# Patient Record
Sex: FEMALE | Race: WHITE | NOT HISPANIC OR LATINO
[De-identification: names, ages, dates, MRNs, and addresses within clinical notes are randomized per-mention and may not be internally consistent; named-entity substitution may affect disease eponyms.]

---

## 2019-02-11 PROBLEM — Z00.00 ENCOUNTER FOR PREVENTIVE HEALTH EXAMINATION: Status: ACTIVE | Noted: 2019-02-11

## 2019-02-19 ENCOUNTER — APPOINTMENT (OUTPATIENT)
Dept: NEUROSURGERY | Facility: CLINIC | Age: 74
End: 2019-02-19
Payer: MEDICARE

## 2019-02-19 DIAGNOSIS — M47.816 SPONDYLOSIS W/OUT MYELOPATHY OR RADICULOPATHY, LUMBAR REGION: ICD-10-CM

## 2019-02-19 PROCEDURE — 99204 OFFICE O/P NEW MOD 45 MIN: CPT

## 2019-02-20 PROBLEM — M47.816 LUMBAR SPONDYLOSIS: Status: ACTIVE | Noted: 2019-02-20

## 2019-02-20 RX ORDER — METOPROLOL SUCCINATE 100 MG/1
100 TABLET, EXTENDED RELEASE ORAL
Refills: 0 | Status: ACTIVE | COMMUNITY

## 2019-02-20 NOTE — PLAN
[FreeTextEntry1] : At this time Ms. Wen has had significant resolution of her radicular symptoms. Her hip pain is controlled with cortisone injections at this time. She will cont being active. SHould her hip symptoms return in a more proximal fashion she will be refered to orthopedic surgery. No intervention at this time for her lumbar spondylosis as she is asymptomatic. . Will cont to follow her as needed.

## 2019-02-20 NOTE — HISTORY OF PRESENT ILLNESS
[FreeTextEntry1] : Improved leg pain [de-identified] : This is a divina 73 year old female who was a previous patient at [facility]. SHe has had a previous L4-5 laminoforaminotomy with resumption of radiculopathy followed by revision fro stenosis and scarring in that area by me several years ago. She continued to have radiculopathy post op however now with time and PT she has had significant improvemen in her Left lower extremity pain. She has been having left hip pain which she has had 2 cortisone injections she has had 1 year relief with the first and just had the second. SHe is here to discuss the findings of her new lumbar MRI obtained by her pain management doctor which shows left L4-5 post op changes and continued significant foraminal stenosis. Her MRI hip on the left shows severe advanced OA and labral tear.

## 2022-10-26 ENCOUNTER — APPOINTMENT (OUTPATIENT)
Dept: ORTHOPEDIC SURGERY | Facility: CLINIC | Age: 77
End: 2022-10-26

## 2022-10-26 ENCOUNTER — OUTPATIENT (OUTPATIENT)
Dept: OUTPATIENT SERVICES | Facility: HOSPITAL | Age: 77
LOS: 1 days | End: 2022-10-26
Payer: MEDICARE

## 2022-10-26 ENCOUNTER — RESULT REVIEW (OUTPATIENT)
Age: 77
End: 2022-10-26

## 2022-10-26 VITALS
HEART RATE: 74 BPM | OXYGEN SATURATION: 97 % | HEIGHT: 59 IN | WEIGHT: 130 LBS | TEMPERATURE: 98.4 F | DIASTOLIC BLOOD PRESSURE: 80 MMHG | BODY MASS INDEX: 26.21 KG/M2 | SYSTOLIC BLOOD PRESSURE: 160 MMHG

## 2022-10-26 DIAGNOSIS — Z96.651 PRESENCE OF RIGHT ARTIFICIAL KNEE JOINT: ICD-10-CM

## 2022-10-26 DIAGNOSIS — Z78.9 OTHER SPECIFIED HEALTH STATUS: ICD-10-CM

## 2022-10-26 DIAGNOSIS — Z86.39 PERSONAL HISTORY OF OTHER ENDOCRINE, NUTRITIONAL AND METABOLIC DISEASE: ICD-10-CM

## 2022-10-26 DIAGNOSIS — Z87.39 PERSONAL HISTORY OF OTHER DISEASES OF THE MUSCULOSKELETAL SYSTEM AND CONNECTIVE TISSUE: ICD-10-CM

## 2022-10-26 DIAGNOSIS — Z86.79 PERSONAL HISTORY OF OTHER DISEASES OF THE CIRCULATORY SYSTEM: ICD-10-CM

## 2022-10-26 PROCEDURE — 73562 X-RAY EXAM OF KNEE 3: CPT | Mod: 26,RT

## 2022-10-26 PROCEDURE — 73562 X-RAY EXAM OF KNEE 3: CPT

## 2022-10-26 PROCEDURE — 99024 POSTOP FOLLOW-UP VISIT: CPT

## 2022-10-26 RX ORDER — ESCITALOPRAM OXALATE 10 MG/1
10 TABLET, FILM COATED ORAL
Refills: 0 | Status: ACTIVE | COMMUNITY

## 2022-10-26 RX ORDER — ASPIRIN 81 MG
81 TABLET, DELAYED RELEASE (ENTERIC COATED) ORAL
Refills: 0 | Status: ACTIVE | COMMUNITY

## 2022-10-26 RX ORDER — AMLODIPINE BESYLATE 2.5 MG/1
2.5 TABLET ORAL
Refills: 0 | Status: ACTIVE | COMMUNITY

## 2022-10-26 RX ORDER — ESCITALOPRAM OXALATE 5 MG/1
5 TABLET, FILM COATED ORAL
Refills: 0 | Status: ACTIVE | COMMUNITY

## 2022-10-26 RX ORDER — ACETAMINOPHEN 500 MG/1
500 TABLET ORAL
Qty: 30 | Refills: 0 | Status: ACTIVE | COMMUNITY
Start: 2022-10-26 | End: 1900-01-01

## 2022-10-26 RX ORDER — ATORVASTATIN CALCIUM 40 MG/1
40 TABLET, FILM COATED ORAL
Refills: 0 | Status: ACTIVE | COMMUNITY

## 2022-10-26 NOTE — HISTORY OF PRESENT ILLNESS
[de-identified] : Ms. Wen is a pleasant patient who is just over 2 months status post right total knee arthroplasty.  She is doing very well.  She continues with physical therapy.  Not using any assistive devices.  No fevers chills night sweats nausea vomiting chest pain or shortness of breath.  She is taking Aleve for pain relief at times.  She feels the knee may lack some full extension but otherwise is very happy with her result. [Stable] : stable

## 2022-10-26 NOTE — ASSESSMENT
[FreeTextEntry1] : Patient is 2+ month status post right total knee arthroplasty.  She had a stable postoperative course.  She is doing very well.  We talked about activity advancement strategies and precautions she is going to continue physical therapy.  She is not quite ready to return to work given the amount of walking involves that she works for the [employer].  We discussed symptomatic treatments that she can use if needed including use of acetaminophen.  At this point she is very pleased with her results.  We are going to follow-up in approximately 6 months.  She will hesitate to contact with any questions or concerns in the interim.

## 2022-10-26 NOTE — REASON FOR VISIT
[Initial Visit] : an initial visit for [Total Knee Replacement Surgery, Aftercare] : total knee replacement surgery, aftercare

## 2022-10-26 NOTE — PHYSICAL EXAM
[Normal] : Gait: normal [LE] : Sensory: Intact in bilateral lower extremities [de-identified] : Examination the right knee reveals a well-healed incision.  Range of motion from -2 to 130 degrees.  There is no instability to varus valgus and posterior stability testing.  Swelling is within normal limits.  There is some clicking with range of motion.\par \par Negative Homans [de-identified] : 3 views of the right knee are reviewed.  There is a total knee arthroplasty in place with no hardware complication loosening fracture or dislocation

## 2023-09-06 ENCOUNTER — OUTPATIENT (OUTPATIENT)
Dept: OUTPATIENT SERVICES | Facility: HOSPITAL | Age: 78
LOS: 1 days | End: 2023-09-06

## 2023-09-06 ENCOUNTER — APPOINTMENT (OUTPATIENT)
Dept: RADIOLOGY | Facility: CLINIC | Age: 78
End: 2023-09-06
Payer: MEDICARE

## 2023-09-06 ENCOUNTER — APPOINTMENT (OUTPATIENT)
Dept: ORTHOPEDIC SURGERY | Facility: CLINIC | Age: 78
End: 2023-09-06
Payer: MEDICARE

## 2023-09-06 ENCOUNTER — RESULT REVIEW (OUTPATIENT)
Age: 78
End: 2023-09-06

## 2023-09-06 VITALS
HEART RATE: 80 BPM | SYSTOLIC BLOOD PRESSURE: 145 MMHG | WEIGHT: 130 LBS | OXYGEN SATURATION: 97 % | DIASTOLIC BLOOD PRESSURE: 76 MMHG | HEIGHT: 59 IN | BODY MASS INDEX: 26.21 KG/M2

## 2023-09-06 DIAGNOSIS — M16.12 UNILATERAL PRIMARY OSTEOARTHRITIS, LEFT HIP: ICD-10-CM

## 2023-09-06 PROCEDURE — 73501 X-RAY EXAM HIP UNI 1 VIEW: CPT | Mod: 26,LT

## 2023-09-06 PROCEDURE — 99214 OFFICE O/P EST MOD 30 MIN: CPT

## 2023-09-06 PROCEDURE — 73503 X-RAY EXAM HIP UNI 4/> VIEWS: CPT

## 2023-09-06 NOTE — HISTORY OF PRESENT ILLNESS
[de-identified] : Patient presents today for evaluation of her left hip.  She had a left hip replacement she says approximately 4 years ago.  She has had a right knee replacement about a year ago and has no difficulties with that.  She was doing well and then was squatting at home and had a dislocation of her left hip.  She had to call for help and was taken to emergency room where closed reduction was performed.  Prior to that she had noticed 2 episodes where she felt it sublux but it had not completely dislocated.  Of note she has had significant spine disease which has been worsening.  She has been followed by neurology and was told that surgery reviewed no further benefit for her although the lower lumbar levels were significantly affected and she has developed a foot drop on the left side.  She is currently being fitted for an AFO brace but does not have that at this time.  She denies fevers chills or any known significant infections.

## 2023-09-06 NOTE — REVIEW OF SYSTEMS
[Arthralgia] : no arthralgia [Joint Pain] : no joint pain [Joint Stiffness] : no joint stiffness [Chills] : no chills

## 2023-09-06 NOTE — DISCUSSION/SUMMARY
[de-identified] : Status post left total hip arthroplasty with recent dislocation after several episodes of what sounds like subluxation.  I suspect that her spine pathology and associated nerve pathology may be a contributing factor.  She was not able to tolerate the knee immobilizer but will continue using a cane.  Instructed to use a pillow between her legs at night.  I am going to send her for screening ESR and CRP although this does not seem to be consistent with infectious etiology.  We are going to try to treat this nonoperatively with physical therapy as well with strict posterior hip precautions rather than immediate revision surgery low we did discuss that activities are necessary.  We will follow-up in 6 weeks to gauge her response.  In the interim I am also going to obtain sitting and standing films of the lumbar spine.

## 2023-09-06 NOTE — PHYSICAL EXAM
[de-identified] : Physical exam she is alert and oriented.  She is walking with mild antalgia.  Her left hip incision is well-healed.  I did not take her range of motion given the recent incident.  She does have weakness of ankle dorsiflexion 3-4 out of 5 as well as inability to dorsiflex the great toe on the left side.  Foot eversion and inversion are 4+ out of 5. [de-identified] : 4 views of the hip are taken.  There is an uncemented hip prosthesis with no obvious hardware complication loosening fracture or dislocation.

## 2023-09-11 LAB
CRP SERPL-MCNC: <3 MG/L
ERYTHROCYTE [SEDIMENTATION RATE] IN BLOOD BY WESTERGREN METHOD: 11 MM/HR
HCT VFR BLD CALC: 42.1 %
HGB BLD-MCNC: 13.5 G/DL
MCHC RBC-ENTMCNC: 31.3 PG
MCHC RBC-ENTMCNC: 32.1 GM/DL
MCV RBC AUTO: 97.7 FL
PLATELET # BLD AUTO: 183 K/UL
RBC # BLD: 4.31 M/UL
RBC # FLD: 12.6 %
WBC # FLD AUTO: 7.27 K/UL

## 2023-10-25 ENCOUNTER — OUTPATIENT (OUTPATIENT)
Dept: OUTPATIENT SERVICES | Facility: HOSPITAL | Age: 78
LOS: 1 days | End: 2023-10-25

## 2023-10-25 ENCOUNTER — APPOINTMENT (OUTPATIENT)
Dept: ORTHOPEDIC SURGERY | Facility: CLINIC | Age: 78
End: 2023-10-25
Payer: MEDICARE

## 2023-10-25 ENCOUNTER — APPOINTMENT (OUTPATIENT)
Dept: RADIOLOGY | Facility: CLINIC | Age: 78
End: 2023-10-25
Payer: MEDICARE

## 2023-10-25 VITALS
HEIGHT: 59 IN | WEIGHT: 130 LBS | DIASTOLIC BLOOD PRESSURE: 90 MMHG | SYSTOLIC BLOOD PRESSURE: 182 MMHG | OXYGEN SATURATION: 96 % | BODY MASS INDEX: 26.21 KG/M2 | HEART RATE: 84 BPM

## 2023-10-25 PROCEDURE — 73502 X-RAY EXAM HIP UNI 2-3 VIEWS: CPT | Mod: 26,LT

## 2023-10-25 PROCEDURE — 99214 OFFICE O/P EST MOD 30 MIN: CPT

## 2023-12-11 ENCOUNTER — OUTPATIENT (OUTPATIENT)
Dept: OUTPATIENT SERVICES | Facility: HOSPITAL | Age: 78
LOS: 1 days | End: 2023-12-11
Payer: MEDICARE

## 2023-12-11 ENCOUNTER — LABORATORY RESULT (OUTPATIENT)
Age: 78
End: 2023-12-11

## 2023-12-11 ENCOUNTER — RESULT REVIEW (OUTPATIENT)
Age: 78
End: 2023-12-11

## 2023-12-11 ENCOUNTER — APPOINTMENT (OUTPATIENT)
Dept: ORTHOPEDIC SURGERY | Facility: CLINIC | Age: 78
End: 2023-12-11
Payer: MEDICARE

## 2023-12-11 VITALS
OXYGEN SATURATION: 96 % | SYSTOLIC BLOOD PRESSURE: 145 MMHG | DIASTOLIC BLOOD PRESSURE: 77 MMHG | WEIGHT: 137 LBS | BODY MASS INDEX: 27.62 KG/M2 | HEART RATE: 72 BPM | HEIGHT: 59 IN

## 2023-12-11 DIAGNOSIS — Z01.818 ENCOUNTER FOR OTHER PREPROCEDURAL EXAMINATION: ICD-10-CM

## 2023-12-11 DIAGNOSIS — Z22.322 CARRIER OR SUSPECTED CARRIER OF METHICILLIN RESISTANT STAPHYLOCOCCUS AUREUS: ICD-10-CM

## 2023-12-11 DIAGNOSIS — M85.80 OTHER SPECIFIED DISORDERS OF BONE DENSITY AND STRUCTURE, UNSPECIFIED SITE: ICD-10-CM

## 2023-12-11 DIAGNOSIS — E83.10 DISORDER OF IRON METABOLISM, UNSPECIFIED: ICD-10-CM

## 2023-12-11 DIAGNOSIS — R79.1 ABNORMAL COAGULATION PROFILE: ICD-10-CM

## 2023-12-11 DIAGNOSIS — R82.90 UNSPECIFIED ABNORMAL FINDINGS IN URINE: ICD-10-CM

## 2023-12-11 LAB
MRSA SPEC QL CULT: NEGATIVE
STAPH AUREUS (SA): NEGATIVE

## 2023-12-11 PROCEDURE — 73502 X-RAY EXAM HIP UNI 2-3 VIEWS: CPT | Mod: 26,LT

## 2023-12-11 PROCEDURE — 73502 X-RAY EXAM HIP UNI 2-3 VIEWS: CPT

## 2023-12-11 PROCEDURE — 99214 OFFICE O/P EST MOD 30 MIN: CPT

## 2023-12-11 PROCEDURE — 71046 X-RAY EXAM CHEST 2 VIEWS: CPT

## 2023-12-11 PROCEDURE — 71046 X-RAY EXAM CHEST 2 VIEWS: CPT | Mod: 26

## 2023-12-12 LAB
25(OH)D3 SERPL-MCNC: 27.8 NG/ML
ALBUMIN SERPL ELPH-MCNC: 4.4 G/DL
ALP BLD-CCNC: 90 U/L
ALT SERPL-CCNC: 36 U/L
ANION GAP SERPL CALC-SCNC: 10 MMOL/L
APPEARANCE: CLEAR
APTT BLD: 31.3 SEC
AST SERPL-CCNC: 38 U/L
BASOPHILS # BLD AUTO: 0.03 K/UL
BASOPHILS NFR BLD AUTO: 0.5 %
BILIRUB SERPL-MCNC: 0.3 MG/DL
BILIRUBIN URINE: NEGATIVE
BLOOD URINE: NEGATIVE
BUN SERPL-MCNC: 22 MG/DL
CALCIUM SERPL-MCNC: 9.6 MG/DL
CHLORIDE SERPL-SCNC: 102 MMOL/L
CO2 SERPL-SCNC: 28 MMOL/L
COLOR: YELLOW
CREAT SERPL-MCNC: 0.65 MG/DL
EGFR: 91 ML/MIN/1.73M2
EOSINOPHIL # BLD AUTO: 0.24 K/UL
EOSINOPHIL NFR BLD AUTO: 3.9 %
GLUCOSE QUALITATIVE U: NEGATIVE MG/DL
GLUCOSE SERPL-MCNC: 109 MG/DL
HCT VFR BLD CALC: 39.6 %
HGB BLD-MCNC: 12.8 G/DL
IMM GRANULOCYTES NFR BLD AUTO: 0.3 %
INR PPP: 1.05 RATIO
KETONES URINE: NEGATIVE MG/DL
LEUKOCYTE ESTERASE URINE: ABNORMAL
LYMPHOCYTES # BLD AUTO: 1.87 K/UL
LYMPHOCYTES NFR BLD AUTO: 30.6 %
MAN DIFF?: NORMAL
MCHC RBC-ENTMCNC: 30.7 PG
MCHC RBC-ENTMCNC: 32.3 GM/DL
MCV RBC AUTO: 95 FL
MONOCYTES # BLD AUTO: 0.5 K/UL
MONOCYTES NFR BLD AUTO: 8.2 %
NEUTROPHILS # BLD AUTO: 3.45 K/UL
NEUTROPHILS NFR BLD AUTO: 56.5 %
NITRITE URINE: NEGATIVE
PH URINE: 5.5
PLATELET # BLD AUTO: 210 K/UL
POTASSIUM SERPL-SCNC: 3.8 MMOL/L
PREALB SERPL NEPH-MCNC: 26 MG/DL
PROT SERPL-MCNC: 6.6 G/DL
PROTEIN URINE: NEGATIVE MG/DL
PT BLD: 11.9 SEC
RBC # BLD: 4.17 M/UL
RBC # FLD: 12.4 %
SODIUM SERPL-SCNC: 140 MMOL/L
SPECIFIC GRAVITY URINE: 1.02
TRANSFERRIN SERPL-MCNC: 287 MG/DL
UROBILINOGEN URINE: 0.2 MG/DL
WBC # FLD AUTO: 6.11 K/UL

## 2023-12-21 ENCOUNTER — TRANSCRIPTION ENCOUNTER (OUTPATIENT)
Age: 78
End: 2023-12-21

## 2023-12-21 VITALS
RESPIRATION RATE: 16 BRPM | TEMPERATURE: 98 F | WEIGHT: 138.23 LBS | SYSTOLIC BLOOD PRESSURE: 155 MMHG | DIASTOLIC BLOOD PRESSURE: 83 MMHG | OXYGEN SATURATION: 97 % | HEIGHT: 59 IN | HEART RATE: 69 BPM

## 2023-12-21 RX ORDER — POVIDONE-IODINE 5 %
1 AEROSOL (ML) TOPICAL ONCE
Refills: 0 | Status: COMPLETED | OUTPATIENT
Start: 2023-12-22 | End: 2023-12-22

## 2023-12-21 NOTE — ASU PATIENT PROFILE, ADULT - NSICDXPASTSURGICALHX_GEN_ALL_CORE_FT
PAST SURGICAL HISTORY:  Previous back surgery x2    S/P knee replacement right     PAST SURGICAL HISTORY:  Previous back surgery x2    S/P knee replacement right    S/P total left hip arthroplasty 2019

## 2023-12-21 NOTE — ASU PATIENT PROFILE, ADULT - NS PRO AD PATIENT TYPE
Dr Rooney, primary care, [phone]/Health Care Proxy (HCP) Dr Rooney, primary care, [phone]/Health Care Proxy (HCP)

## 2023-12-21 NOTE — H&P ADULT - NSHPPHYSICALEXAM_GEN_ALL_CORE
PE: Decreased ROM to left hip secondary to pain      Rest of PE per medical clearance PE: Left LE with Knee immobilizer on. SLT INTACT, DP/PT 2+, EHL 0/5  FHL/TA/GS 5/5  Decreased ROM to left hip secondary to pain      Rest of PE per medical clearance

## 2023-12-21 NOTE — H&P ADULT - NSHPLABSRESULTS_GEN_ALL_CORE
Preop EKG: NSR, reviewed by medical clearance.   CXR: Within normal limits, per medical clearance.   Preop H/H 12.1/35.8  Cr 0.7 preop   3M DOS

## 2023-12-21 NOTE — ASU PATIENT PROFILE, ADULT - NSICDXPASTMEDICALHX_GEN_ALL_CORE_FT
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

## 2023-12-21 NOTE — ASU PATIENT PROFILE, ADULT - FALL HARM RISK - HARM RISK INTERVENTIONS
Communicate Risk of Fall with Harm to all staff/Reinforce activity limits and safety measures with patient and family/Tailored Fall Risk Interventions/Visual Cue: Yellow wristband and red socks/Bed in lowest position, wheels locked, appropriate side rails in place/Call bell, personal items and telephone in reach/Instruct patient to call for assistance before getting out of bed or chair/Non-slip footwear when patient is out of bed/[place] to call system/Physically safe environment - no spills, clutter or unnecessary equipment/Purposeful Proactive Rounding/Room/bathroom lighting operational, light cord in reach Communicate Risk of Fall with Harm to all staff/Reinforce activity limits and safety measures with patient and family/Tailored Fall Risk Interventions/Visual Cue: Yellow wristband and red socks/Bed in lowest position, wheels locked, appropriate side rails in place/Call bell, personal items and telephone in reach/Instruct patient to call for assistance before getting out of bed or chair/Non-slip footwear when patient is out of bed/[place] to call system/Physically safe environment - no spills, clutter or unnecessary equipment/Purposeful Proactive Rounding/Room/bathroom lighting operational, light cord in reach

## 2023-12-21 NOTE — H&P ADULT - PROBLEM SELECTOR PLAN 1
Admit to Orthopaedic Service.  Presents today for revision left hip replacement   Pt medically stable and cleared for procedure today by Dr. Georges

## 2023-12-21 NOTE — H&P ADULT - NSICDXPASTMEDICALHX_GEN_ALL_CORE_FT
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

## 2023-12-21 NOTE — H&P ADULT - HISTORY OF PRESENT ILLNESS
78F with left hip pain x       Presents today for revision of left hip replacement.  79yo f coming in today for Revision LEFT THR. Pt. reports she had initial LEFT THR done with Dr. Archer in 2019 vs 2020? Pt. was doing well but within the last 2 weeks she dislocated three times. Pt. reports she was trying to put on socks or tie her shoes. She lives in [place] and was reduced at [facility]. Pt. went to see Dr. Archer, had xrays done and was told she needs revision (possibly a bigger head). Pt. has baseline neuropathy due to spine issues and history of drop foot. Pt. has been wearing knee immobilizer since dislocations.       Presents today for revision of left hip replacement.  79yo f coming in today for Revision LEFT THR. Pt. reports she had initial LEFT THR done with Dr. Archer in 2019 vs 2020? Pt. was doing well but within the last 2 weeks she dislocated three times. Pt. reports she was trying to put on socks or tie her shoes. She lives in [place] and was reduced at [facility]. Pt. went to see Dr. Archer, had xrays done and was told she needs revision (possibly a bigger head). Pt. has baseline neuropathy due to spine issues and history of drop foot. Pt. has been wearing knee immobilizer since dislocations.       Presents today for revision of left hip replacement.

## 2023-12-21 NOTE — ASU PREOP CHECKLIST - AICD PRESENT
Patient called stating that she believes she has a bladder infection. Patient can be reached at [phone].    no

## 2023-12-21 NOTE — ASU PREOP CHECKLIST - ORDERS/MEDICATION ADMINISTRATION RECORD ON CHART
Plan: Discussed diagnosis in detail with patient today. 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 Patient states that her father had this condition also. Informed patient that treatment is to maintain the symptoms as there is no cure for this condition. Recommended for patient to moisturize her hands daily. Informed patient that some patient's with this condition with shave their hands to reduce the appearance and the texture of these lesions. Patient voiced understanding and states that these lesions do not bother her
Initiate Treatment: - CeraVe SA - Apply as a moisturizer daily \\nOr\\n- Amlactin Cream - Use as a moisturizer daily
Detail Level: Generalized
done

## 2023-12-21 NOTE — H&P ADULT - PROBLEM SELECTOR PLAN 3
"----- Message from Darwin Gutierrez 
Continue outpatient treatment regimen

## 2023-12-22 ENCOUNTER — RESULT REVIEW (OUTPATIENT)
Age: 78
End: 2023-12-22

## 2023-12-22 ENCOUNTER — INPATIENT (INPATIENT)
Facility: HOSPITAL | Age: 78
LOS: 0 days | Discharge: HOME | DRG: 467 | End: 2023-12-23
Attending: SPECIALIST | Admitting: SPECIALIST
Payer: MEDICARE

## 2023-12-22 ENCOUNTER — APPOINTMENT (OUTPATIENT)
Dept: ORTHOPEDIC SURGERY | Facility: HOSPITAL | Age: 78
End: 2023-12-22

## 2023-12-22 DIAGNOSIS — E78.5 HYPERLIPIDEMIA, UNSPECIFIED: ICD-10-CM

## 2023-12-22 DIAGNOSIS — Z98.890 OTHER SPECIFIED POSTPROCEDURAL STATES: Chronic | ICD-10-CM

## 2023-12-22 DIAGNOSIS — I10 ESSENTIAL (PRIMARY) HYPERTENSION: ICD-10-CM

## 2023-12-22 DIAGNOSIS — Z96.642 PRESENCE OF LEFT ARTIFICIAL HIP JOINT: Chronic | ICD-10-CM

## 2023-12-22 DIAGNOSIS — Z96.659 PRESENCE OF UNSPECIFIED ARTIFICIAL KNEE JOINT: Chronic | ICD-10-CM

## 2023-12-22 DIAGNOSIS — M25.559 PAIN IN UNSPECIFIED HIP: ICD-10-CM

## 2023-12-22 DIAGNOSIS — M48.00 SPINAL STENOSIS, SITE UNSPECIFIED: ICD-10-CM

## 2023-12-22 LAB
GRAM STN FLD: SIGNIFICANT CHANGE UP
SPECIMEN SOURCE: SIGNIFICANT CHANGE UP

## 2023-12-22 PROCEDURE — 72170 X-RAY EXAM OF PELVIS: CPT | Mod: 26

## 2023-12-22 PROCEDURE — 27134 REVISE HIP JOINT REPLACEMENT: CPT | Mod: 52,LT

## 2023-12-22 DEVICE — LINER ACET OR3O DM XLPE 40/52MM: Type: IMPLANTABLE DEVICE | Site: LEFT | Status: FUNCTIONAL

## 2023-12-22 DEVICE — FEM HD TAPER OXINIUM 28MM: Type: IMPLANTABLE DEVICE | Site: LEFT | Status: FUNCTIONAL

## 2023-12-22 DEVICE — INSERT ACET OR3O DUAL MOBILITY 28/40MM: Type: IMPLANTABLE DEVICE | Site: LEFT | Status: FUNCTIONAL

## 2023-12-22 RX ORDER — ONDANSETRON 8 MG/1
4 TABLET, FILM COATED ORAL EVERY 6 HOURS
Refills: 0 | Status: DISCONTINUED | OUTPATIENT
Start: 2023-12-22 | End: 2023-12-23

## 2023-12-22 RX ORDER — CEFAZOLIN SODIUM 1 G
2000 VIAL (EA) INJECTION EVERY 8 HOURS
Refills: 0 | Status: COMPLETED | OUTPATIENT
Start: 2023-12-22 | End: 2023-12-23

## 2023-12-22 RX ORDER — ASPIRIN/CALCIUM CARB/MAGNESIUM 324 MG
81 TABLET ORAL
Refills: 0 | Status: DISCONTINUED | OUTPATIENT
Start: 2023-12-23 | End: 2023-12-23

## 2023-12-22 RX ORDER — ACETAMINOPHEN 500 MG
1000 TABLET ORAL EVERY 8 HOURS
Refills: 0 | Status: DISCONTINUED | OUTPATIENT
Start: 2023-12-22 | End: 2023-12-23

## 2023-12-22 RX ORDER — ESCITALOPRAM OXALATE 10 MG/1
10 TABLET, FILM COATED ORAL DAILY
Refills: 0 | Status: DISCONTINUED | OUTPATIENT
Start: 2023-12-22 | End: 2023-12-23

## 2023-12-22 RX ORDER — OXYCODONE HYDROCHLORIDE 5 MG/1
2.5 TABLET ORAL EVERY 4 HOURS
Refills: 0 | Status: DISCONTINUED | OUTPATIENT
Start: 2023-12-22 | End: 2023-12-23

## 2023-12-22 RX ORDER — ACETAMINOPHEN 500 MG
1000 TABLET ORAL ONCE
Refills: 0 | Status: COMPLETED | OUTPATIENT
Start: 2023-12-22 | End: 2023-12-22

## 2023-12-22 RX ORDER — SODIUM CHLORIDE 9 MG/ML
1000 INJECTION, SOLUTION INTRAVENOUS
Refills: 0 | Status: DISCONTINUED | OUTPATIENT
Start: 2023-12-22 | End: 2023-12-23

## 2023-12-22 RX ORDER — CELECOXIB 200 MG/1
200 CAPSULE ORAL ONCE
Refills: 0 | Status: COMPLETED | OUTPATIENT
Start: 2023-12-22 | End: 2023-12-22

## 2023-12-22 RX ORDER — CELECOXIB 200 MG/1
100 CAPSULE ORAL EVERY 12 HOURS
Refills: 0 | Status: DISCONTINUED | OUTPATIENT
Start: 2023-12-22 | End: 2023-12-23

## 2023-12-22 RX ORDER — POLYETHYLENE GLYCOL 3350 17 G/17G
17 POWDER, FOR SOLUTION ORAL AT BEDTIME
Refills: 0 | Status: DISCONTINUED | OUTPATIENT
Start: 2023-12-22 | End: 2023-12-23

## 2023-12-22 RX ORDER — FAMOTIDINE 10 MG/ML
20 INJECTION INTRAVENOUS DAILY
Refills: 0 | Status: DISCONTINUED | OUTPATIENT
Start: 2023-12-22 | End: 2023-12-23

## 2023-12-22 RX ORDER — SENNA PLUS 8.6 MG/1
2 TABLET ORAL AT BEDTIME
Refills: 0 | Status: DISCONTINUED | OUTPATIENT
Start: 2023-12-22 | End: 2023-12-23

## 2023-12-22 RX ORDER — OXYCODONE HYDROCHLORIDE 5 MG/1
5 TABLET ORAL EVERY 6 HOURS
Refills: 0 | Status: DISCONTINUED | OUTPATIENT
Start: 2023-12-22 | End: 2023-12-23

## 2023-12-22 RX ORDER — AMLODIPINE BESYLATE 2.5 MG/1
2.5 TABLET ORAL DAILY
Refills: 0 | Status: DISCONTINUED | OUTPATIENT
Start: 2023-12-22 | End: 2023-12-23

## 2023-12-22 RX ORDER — CHLORHEXIDINE GLUCONATE 213 G/1000ML
1 SOLUTION TOPICAL ONCE
Refills: 0 | Status: COMPLETED | OUTPATIENT
Start: 2023-12-22 | End: 2023-12-22

## 2023-12-22 RX ORDER — METOPROLOL TARTRATE 50 MG
100 TABLET ORAL DAILY
Refills: 0 | Status: DISCONTINUED | OUTPATIENT
Start: 2023-12-22 | End: 2023-12-23

## 2023-12-22 RX ORDER — MAGNESIUM HYDROXIDE 400 MG/1
30 TABLET, CHEWABLE ORAL DAILY
Refills: 0 | Status: DISCONTINUED | OUTPATIENT
Start: 2023-12-22 | End: 2023-12-23

## 2023-12-22 RX ORDER — ATORVASTATIN CALCIUM 80 MG/1
40 TABLET, FILM COATED ORAL AT BEDTIME
Refills: 0 | Status: DISCONTINUED | OUTPATIENT
Start: 2023-12-22 | End: 2023-12-23

## 2023-12-22 RX ADMIN — ATORVASTATIN CALCIUM 40 MILLIGRAM(S): 80 TABLET, FILM COATED ORAL at 22:19

## 2023-12-22 RX ADMIN — CELECOXIB 200 MILLIGRAM(S): 200 CAPSULE ORAL at 11:02

## 2023-12-22 RX ADMIN — CHLORHEXIDINE GLUCONATE 1 APPLICATION(S): 213 SOLUTION TOPICAL at 11:02

## 2023-12-22 RX ADMIN — Medication 1 APPLICATION(S): at 11:17

## 2023-12-22 RX ADMIN — CELECOXIB 100 MILLIGRAM(S): 200 CAPSULE ORAL at 17:57

## 2023-12-22 RX ADMIN — SENNA PLUS 2 TABLET(S): 8.6 TABLET ORAL at 22:19

## 2023-12-22 RX ADMIN — Medication 100 MILLIGRAM(S): at 22:18

## 2023-12-22 RX ADMIN — Medication 1000 MILLIGRAM(S): at 11:02

## 2023-12-22 RX ADMIN — POLYETHYLENE GLYCOL 3350 17 GRAM(S): 17 POWDER, FOR SOLUTION ORAL at 22:19

## 2023-12-22 RX ADMIN — Medication 1000 MILLIGRAM(S): at 23:18

## 2023-12-22 RX ADMIN — OXYCODONE HYDROCHLORIDE 5 MILLIGRAM(S): 5 TABLET ORAL at 17:09

## 2023-12-22 RX ADMIN — OXYCODONE HYDROCHLORIDE 5 MILLIGRAM(S): 5 TABLET ORAL at 17:48

## 2023-12-22 RX ADMIN — Medication 1000 MILLIGRAM(S): at 22:18

## 2023-12-22 NOTE — PHYSICAL THERAPY INITIAL EVALUATION ADULT - PERTINENT HX OF CURRENT PROBLEM, REHAB EVAL
79yo f coming in today for Revision LEFT THR. Pt. reports she had initial LEFT THR done with Dr. Archer in 2019 vs 2020? Pt. was doing well but within the last 2 weeks she dislocated three times. Pt. reports she was trying to put on socks or tie her shoes. She lives in [place] and was reduced at [facility]. Pt. went to see Dr. Archer, had xrays done and was told she needs revision (possibly a bigger head). Pt. has baseline neuropathy due to spine issues and history of drop foot. Pt. has been wearing knee immobilizer since dislocations. Now s/p  revision left hip replacement- head and liner exchange due to instability 77yo f coming in today for Revision LEFT THR. Pt. reports she had initial LEFT THR done with Dr. Archer in 2019 vs 2020? Pt. was doing well but within the last 2 weeks she dislocated three times. Pt. reports she was trying to put on socks or tie her shoes. She lives in [place] and was reduced at [facility]. Pt. went to see Dr. Archer, had xrays done and was told she needs revision (possibly a bigger head). Pt. has baseline neuropathy due to spine issues and history of drop foot. Pt. has been wearing knee immobilizer since dislocations. Now s/p  revision left hip replacement- head and liner exchange due to instability

## 2023-12-22 NOTE — PHYSICAL THERAPY INITIAL EVALUATION ADULT - GENERAL OBSERVATIONS, REHAB EVAL
Patient received semi-perry in bed  in NAD on RA, +SCDs, +PIV. Cleared by ELIZABETH Cedeño. Agreeable to PT.

## 2023-12-22 NOTE — ANESTHESIA FOLLOW-UP NOTE - NSEVALATION_GEN_ALL_CORE
No apparent complications or complaints regarding anesthesia care at this time/All questions were answered Use Map Statement For Sites (Optional): No

## 2023-12-22 NOTE — PHYSICAL THERAPY INITIAL EVALUATION ADULT - MANUAL MUSCLE TESTING RESULTS, REHAB EVAL
B UE and B LE >3+/5 upon functional assessment against gravity except for left DF 3-/5/grossly assessed due to

## 2023-12-22 NOTE — PHYSICAL THERAPY INITIAL EVALUATION ADULT - DIAGNOSIS, PT EVAL
4H: impaired joint mobility, motor function, muscle performance, and range of motion associated with joint arthroplasty 4I: impaired joint mobility, motor function, muscle performance, and range of motion associated with bony or soft tissue surgery

## 2023-12-22 NOTE — PRE-ANESTHESIA EVALUATION ADULT - NSANTHOSAYNRD_GEN_A_CORE
No. VANGIE screening performed.  STOP BANG Legend: 0-2 = LOW Risk; 3-4 = INTERMEDIATE Risk; 5-8 = HIGH Risk

## 2023-12-22 NOTE — PHYSICAL THERAPY INITIAL EVALUATION ADULT - SKIN WDL, MLM
PT DAILY TREATMENT NOTE     Patient Name: Migdalia Lozoya  Date:2022  : 1963  [x]  Patient  Verified  Payor: Randi Castro / Plan: [place] [employer] PPO / Product Type: PPO /    In time:1130am  Out time:1209pm  Total Treatment Time (min): 39  Visit #: 2 of 8      Treatment Area: Right ankle pain [M25.571]    SUBJECTIVE  Pain Level (0-10 scale): 3  Any medication changes, allergies to medications, adverse drug reactions, diagnosis change, or new procedure performed?: [x] No    [] Yes (see summary sheet for update)  Subjective functional status/changes:   [] No changes reported  Reports exercises have been helpful    OBJECTIVE    21 min Therapeutic Exercise:  [x] See flow sheet :   Rationale: increase ROM, increase strength and improve coordination to improve the patients ability to tolerate ADLs. 10 min Therapeutic Activity:  []  See flow sheet : squats, RDL's, HR's with eccentric lowers. Rationale: increase ROM and increase strength  to improve the patients ability to negotiate stairs and ambulate with reduced pain. 8 min Manual Therapy:  Pt prone with slight knee flexion -- IASTM gastroc and very gently to the Achilles with instructions to ice if there is any irritation. GR II/III talocrural DF oscillations followed by prolonged soleus stretch. The manual therapy interventions were performed at a separate and distinct time from the therapeutic activities interventions. Rationale: decrease pain, increase ROM and increase tissue extensibility to improve tolerance to gait. With   [] TE   [] TA   [] neuro   [] other: Patient Education: [x] Review HEP    [] Progressed/Changed HEP based on:   [] positioning   [] body mechanics   [] transfers   [] heat/ice application    [] other:      Other Objective/Functional Measures: exercises initiated per flowsheet     Pain Level (0-10 scale) post treatment: 0    ASSESSMENT/Changes in Function: Pt demonstrates improved pain following treatment today. Formal measurement not obtained, but soleus appears to measure slightly greater than neutral DF in prone and on step with mobilization. If poor response to IASTM will trial low-frequency US per MD referral.     Patient will continue to benefit from skilled PT services to modify and progress therapeutic interventions, address functional mobility deficits, address ROM deficits, address strength deficits, analyze and address soft tissue restrictions, analyze and cue movement patterns, analyze and modify body mechanics/ergonomics, assess and modify postural abnormalities, address imbalance/dizziness and instruct in home and community integration to attain remaining goals. []  See Plan of Care  []  See progress note/recertification  []  See Discharge Summary         Progress towards goals / Updated goals:  Short Term Goals: To be accomplished in 2 weeks:               1. The patient will demonstrate independence and compliance with HEP to maximize therapeutic benefit. IE: issued HEP    Current: reports compliance and improvement in symptoms (2/21/2022)               2. The patient will improve gastroc flexibility to 10 degrees B ankles to improve ease of ADls. IE: Neutral  Long Term Goals: To be accomplished in 4 weeks:               1. The patient will improve FOTO score to 76 to improve ease of ADLs. IE: 79               2. The patient will demonstrate stair negotiation void of pain in order to improve ease of negotiating stairs to dwelling. IE: pain upon descending stairs               3. The patient will report performing walking program without increase in pain to improve ease of recreational activity. IE: currently has pain walking               4. The patient will demonstrate negative tenderness through left achilles tendon insertion to improve ease of ambulation.               IE: painful with palpation    PLAN  []  Upgrade activities as tolerated     [x]  Continue plan of care  []  Update interventions per flow sheet       []  Discharge due to:_  []  Other:_      Nery Hernandez, PT 2/21/2022  11:37 AM    Future Appointments   Date Time Provider Carla Abdul   2/23/2022  4:00 PM Zimmerman Flaxton, PTA [facility]   2/28/2022  4:45 PM Zimmerman Flaxton, PTA [facility]   3/2/2022  4:00 PM Zimmerman Flaxton, PTA [facility]   3/7/2022  4:45 PM Zimmerman Flaxton, PTA [facility]   3/9/2022  4:00 PM Zimmerman Flaxton, PTA [facility]   3/14/2022  4:45 PM Zimmerman Flaxton, PTA [facility]   3/22/2022  3:40 PM José Linda MD [facility] BS AMB WDL except

## 2023-12-22 NOTE — PHYSICAL THERAPY INITIAL EVALUATION ADULT - ADDITIONAL COMMENTS
Patient lives alone in elevator accessible apartment. Has Home Health Aid for 3 days for 3 hours. Ambulates with SC at baseline. Reports multiple recent Hx of falls (pt mainly attributes fall to ' left foot drop ').

## 2023-12-22 NOTE — PROGRESS NOTE ADULT - SUBJECTIVE AND OBJECTIVE BOX
Ortho Post Op Check    Procedure: L MATTIE  Surgeon: Suzi    Pt comfortable without complaints, pain controlled  Denies CP, SOB, N/V, numbness/tingling     Vital Signs Last 24 Hrs  T(C): 36.4 (12-22-23 @ 20:34), Max: 36.5 (12-22-23 @ 18:32)  T(F): 97.6 (12-22-23 @ 20:34), Max: 97.7 (12-22-23 @ 18:32)  HR: 88 (12-22-23 @ 20:34) (72 - 90)  BP: 105/64 (12-22-23 @ 20:34) (105/64 - 144/84)  BP(mean): 85 (12-22-23 @ 17:45) (79 - 90)  RR: 16 (12-22-23 @ 20:34) (11 - 31)  SpO2: 96% (12-22-23 @ 20:34) (89% - 98%)  I&O's Summary    22 Dec 2023 07:01  -  22 Dec 2023 21:04  --------------------------------------------------------  IN: 1190 mL / OUT: 340 mL / NET: 850 mL        Physical Exam:  General: Pt Alert and oriented, NAD  LLE:  DSG C/D/I , xeroform, gauze, tegaderm  Pulses: 2+dp, pt pulses, wwp, cap refill <3 sec  Sensation: SILT in sural/saph/sp/dp/tibial distributions  Motor: EHL/FHL/TA/GS  firing        Post-op X-Ray:  hardware in place    A/P: 78yFemale s/p L MATTIE with Dr. Archer on 12/22  - Stable  - Pain Control  - f/u AM labs  - DVT ppx: SCDs, ASA  - Post op abx: periop ancef  - PT, WBS: WBATm anterior/posterior hip precautions    Alvarez Cary, PGY2  Orthopaedic Surgery

## 2023-12-23 ENCOUNTER — TRANSCRIPTION ENCOUNTER (OUTPATIENT)
Age: 78
End: 2023-12-23

## 2023-12-23 VITALS
DIASTOLIC BLOOD PRESSURE: 58 MMHG | SYSTOLIC BLOOD PRESSURE: 117 MMHG | HEART RATE: 63 BPM | OXYGEN SATURATION: 96 % | RESPIRATION RATE: 17 BRPM | TEMPERATURE: 99 F

## 2023-12-23 LAB
ANION GAP SERPL CALC-SCNC: 10 MMOL/L — SIGNIFICANT CHANGE UP (ref 5–17)
ANION GAP SERPL CALC-SCNC: 10 MMOL/L — SIGNIFICANT CHANGE UP (ref 5–17)
BUN SERPL-MCNC: 14 MG/DL — SIGNIFICANT CHANGE UP (ref 7–23)
BUN SERPL-MCNC: 14 MG/DL — SIGNIFICANT CHANGE UP (ref 7–23)
CALCIUM SERPL-MCNC: 9 MG/DL — SIGNIFICANT CHANGE UP (ref 8.4–10.5)
CALCIUM SERPL-MCNC: 9 MG/DL — SIGNIFICANT CHANGE UP (ref 8.4–10.5)
CHLORIDE SERPL-SCNC: 105 MMOL/L — SIGNIFICANT CHANGE UP (ref 96–108)
CHLORIDE SERPL-SCNC: 105 MMOL/L — SIGNIFICANT CHANGE UP (ref 96–108)
CO2 SERPL-SCNC: 25 MMOL/L — SIGNIFICANT CHANGE UP (ref 22–31)
CO2 SERPL-SCNC: 25 MMOL/L — SIGNIFICANT CHANGE UP (ref 22–31)
CREAT SERPL-MCNC: 0.61 MG/DL — SIGNIFICANT CHANGE UP (ref 0.5–1.3)
CREAT SERPL-MCNC: 0.61 MG/DL — SIGNIFICANT CHANGE UP (ref 0.5–1.3)
EGFR: 93 ML/MIN/1.73M2 — SIGNIFICANT CHANGE UP
EGFR: 93 ML/MIN/1.73M2 — SIGNIFICANT CHANGE UP
GLUCOSE SERPL-MCNC: 144 MG/DL — HIGH (ref 70–99)
GLUCOSE SERPL-MCNC: 144 MG/DL — HIGH (ref 70–99)
HCT VFR BLD CALC: 31.4 % — LOW (ref 34.5–45)
HCT VFR BLD CALC: 31.4 % — LOW (ref 34.5–45)
HGB BLD-MCNC: 10.6 G/DL — LOW (ref 11.5–15.5)
HGB BLD-MCNC: 10.6 G/DL — LOW (ref 11.5–15.5)
MCHC RBC-ENTMCNC: 31.3 PG — SIGNIFICANT CHANGE UP (ref 27–34)
MCHC RBC-ENTMCNC: 31.3 PG — SIGNIFICANT CHANGE UP (ref 27–34)
MCHC RBC-ENTMCNC: 33.8 GM/DL — SIGNIFICANT CHANGE UP (ref 32–36)
MCHC RBC-ENTMCNC: 33.8 GM/DL — SIGNIFICANT CHANGE UP (ref 32–36)
MCV RBC AUTO: 92.6 FL — SIGNIFICANT CHANGE UP (ref 80–100)
MCV RBC AUTO: 92.6 FL — SIGNIFICANT CHANGE UP (ref 80–100)
NRBC # BLD: 0 /100 WBCS — SIGNIFICANT CHANGE UP (ref 0–0)
NRBC # BLD: 0 /100 WBCS — SIGNIFICANT CHANGE UP (ref 0–0)
PLATELET # BLD AUTO: 195 K/UL — SIGNIFICANT CHANGE UP (ref 150–400)
PLATELET # BLD AUTO: 195 K/UL — SIGNIFICANT CHANGE UP (ref 150–400)
POTASSIUM SERPL-MCNC: 3.9 MMOL/L — SIGNIFICANT CHANGE UP (ref 3.5–5.3)
POTASSIUM SERPL-MCNC: 3.9 MMOL/L — SIGNIFICANT CHANGE UP (ref 3.5–5.3)
POTASSIUM SERPL-SCNC: 3.9 MMOL/L — SIGNIFICANT CHANGE UP (ref 3.5–5.3)
POTASSIUM SERPL-SCNC: 3.9 MMOL/L — SIGNIFICANT CHANGE UP (ref 3.5–5.3)
RBC # BLD: 3.39 M/UL — LOW (ref 3.8–5.2)
RBC # BLD: 3.39 M/UL — LOW (ref 3.8–5.2)
RBC # FLD: 12.1 % — SIGNIFICANT CHANGE UP (ref 10.3–14.5)
RBC # FLD: 12.1 % — SIGNIFICANT CHANGE UP (ref 10.3–14.5)
SODIUM SERPL-SCNC: 140 MMOL/L — SIGNIFICANT CHANGE UP (ref 135–145)
SODIUM SERPL-SCNC: 140 MMOL/L — SIGNIFICANT CHANGE UP (ref 135–145)
WBC # BLD: 9.08 K/UL — SIGNIFICANT CHANGE UP (ref 3.8–10.5)
WBC # BLD: 9.08 K/UL — SIGNIFICANT CHANGE UP (ref 3.8–10.5)
WBC # FLD AUTO: 9.08 K/UL — SIGNIFICANT CHANGE UP (ref 3.8–10.5)
WBC # FLD AUTO: 9.08 K/UL — SIGNIFICANT CHANGE UP (ref 3.8–10.5)

## 2023-12-23 PROCEDURE — 86901 BLOOD TYPING SEROLOGIC RH(D): CPT

## 2023-12-23 PROCEDURE — 87075 CULTR BACTERIA EXCEPT BLOOD: CPT

## 2023-12-23 PROCEDURE — 80048 BASIC METABOLIC PNL TOTAL CA: CPT

## 2023-12-23 PROCEDURE — 87205 SMEAR GRAM STAIN: CPT

## 2023-12-23 PROCEDURE — 86900 BLOOD TYPING SEROLOGIC ABO: CPT

## 2023-12-23 PROCEDURE — 99222 1ST HOSP IP/OBS MODERATE 55: CPT

## 2023-12-23 PROCEDURE — 72170 X-RAY EXAM OF PELVIS: CPT

## 2023-12-23 PROCEDURE — 97535 SELF CARE MNGMENT TRAINING: CPT

## 2023-12-23 PROCEDURE — 36415 COLL VENOUS BLD VENIPUNCTURE: CPT

## 2023-12-23 PROCEDURE — 97530 THERAPEUTIC ACTIVITIES: CPT

## 2023-12-23 PROCEDURE — 86850 RBC ANTIBODY SCREEN: CPT

## 2023-12-23 PROCEDURE — 97162 PT EVAL MOD COMPLEX 30 MIN: CPT

## 2023-12-23 PROCEDURE — 97116 GAIT TRAINING THERAPY: CPT

## 2023-12-23 PROCEDURE — C1776: CPT

## 2023-12-23 PROCEDURE — 85027 COMPLETE CBC AUTOMATED: CPT

## 2023-12-23 PROCEDURE — 87102 FUNGUS ISOLATION CULTURE: CPT

## 2023-12-23 PROCEDURE — 97165 OT EVAL LOW COMPLEX 30 MIN: CPT

## 2023-12-23 PROCEDURE — 87070 CULTURE OTHR SPECIMN AEROBIC: CPT

## 2023-12-23 RX ORDER — ASPIRIN/CALCIUM CARB/MAGNESIUM 324 MG
1 TABLET ORAL
Qty: 60 | Refills: 0
Start: 2023-12-23 | End: 2024-01-21

## 2023-12-23 RX ORDER — ACETAMINOPHEN 500 MG
2 TABLET ORAL
Qty: 56 | Refills: 0
Start: 2023-12-23 | End: 2023-12-29

## 2023-12-23 RX ORDER — ESCITALOPRAM OXALATE 10 MG/1
1 TABLET, FILM COATED ORAL
Refills: 0 | DISCHARGE

## 2023-12-23 RX ORDER — CYCLOBENZAPRINE HYDROCHLORIDE 10 MG/1
1 TABLET, FILM COATED ORAL
Qty: 21 | Refills: 0
Start: 2023-12-23 | End: 2023-12-29

## 2023-12-23 RX ORDER — ACETAMINOPHEN 500 MG
2 TABLET ORAL
Refills: 0 | DISCHARGE

## 2023-12-23 RX ORDER — CELECOXIB 200 MG/1
1 CAPSULE ORAL
Qty: 14 | Refills: 0
Start: 2023-12-23 | End: 2024-01-05

## 2023-12-23 RX ORDER — ONDANSETRON 8 MG/1
1 TABLET, FILM COATED ORAL
Qty: 7 | Refills: 0
Start: 2023-12-23 | End: 2023-12-29

## 2023-12-23 RX ORDER — OXYCODONE HYDROCHLORIDE 5 MG/1
1 TABLET ORAL
Qty: 20 | Refills: 0
Start: 2023-12-23 | End: 2023-12-27

## 2023-12-23 RX ORDER — OXYCODONE AND ACETAMINOPHEN 5; 325 MG/1; MG/1
1 TABLET ORAL
Qty: 20 | Refills: 0
Start: 2023-12-23 | End: 2023-12-27

## 2023-12-23 RX ORDER — ONDANSETRON 8 MG/1
1 TABLET, FILM COATED ORAL
Qty: 28 | Refills: 0
Start: 2023-12-23 | End: 2023-12-29

## 2023-12-23 RX ORDER — DOCUSATE SODIUM 100 MG
1 CAPSULE ORAL
Qty: 21 | Refills: 0
Start: 2023-12-23 | End: 2023-12-29

## 2023-12-23 RX ADMIN — OXYCODONE HYDROCHLORIDE 5 MILLIGRAM(S): 5 TABLET ORAL at 03:55

## 2023-12-23 RX ADMIN — Medication 100 MILLIGRAM(S): at 05:14

## 2023-12-23 RX ADMIN — CELECOXIB 100 MILLIGRAM(S): 200 CAPSULE ORAL at 05:14

## 2023-12-23 RX ADMIN — Medication 1000 MILLIGRAM(S): at 05:13

## 2023-12-23 RX ADMIN — OXYCODONE HYDROCHLORIDE 5 MILLIGRAM(S): 5 TABLET ORAL at 14:30

## 2023-12-23 RX ADMIN — CELECOXIB 100 MILLIGRAM(S): 200 CAPSULE ORAL at 17:18

## 2023-12-23 RX ADMIN — OXYCODONE HYDROCHLORIDE 5 MILLIGRAM(S): 5 TABLET ORAL at 13:56

## 2023-12-23 RX ADMIN — OXYCODONE HYDROCHLORIDE 5 MILLIGRAM(S): 5 TABLET ORAL at 04:55

## 2023-12-23 RX ADMIN — Medication 81 MILLIGRAM(S): at 05:14

## 2023-12-23 RX ADMIN — Medication 1000 MILLIGRAM(S): at 06:13

## 2023-12-23 RX ADMIN — Medication 100 MILLIGRAM(S): at 05:13

## 2023-12-23 RX ADMIN — Medication 1000 MILLIGRAM(S): at 13:23

## 2023-12-23 RX ADMIN — Medication 81 MILLIGRAM(S): at 17:18

## 2023-12-23 RX ADMIN — CELECOXIB 100 MILLIGRAM(S): 200 CAPSULE ORAL at 06:13

## 2023-12-23 RX ADMIN — FAMOTIDINE 20 MILLIGRAM(S): 10 INJECTION INTRAVENOUS at 13:23

## 2023-12-23 RX ADMIN — ESCITALOPRAM OXALATE 10 MILLIGRAM(S): 10 TABLET, FILM COATED ORAL at 13:22

## 2023-12-23 RX ADMIN — Medication 1 TABLET(S): at 13:22

## 2023-12-23 NOTE — CONSULT NOTE ADULT - ASSESSMENT
8 YOF with PMH of HTN, HLD, OA, spinal stenosis, stroke, MDD, prior L hip replacement admitted for elective L hip revision s/p OR with Dr. Archer 12/22.    Post operative state  - management per ortho, s/p OR with Dr. Archer on 12/22  - pain control with bowel regimen  - frequent perioperative incentive spirometer use  - early ambulation and OOBTC as tolerated  - consider chemical DVT ppx with LMWH or DOAC (currently asa 81mg BID)    Acute blood loss anemia  - Hgb 12.1 --> 10.6, HDS  - asymptomatic w/o active s/s bleeding  - partially 2/2 operative losses    HTN  - cont home amlodipine 2.5mg, metoprolol succinate 100mg    Stroke HLD  - cont statin  - resume aspirin 81mg daily after DVT ppx duration    MDD  - cont home escitalopram    GERD   - cont home omeprazole    Dispo: home with HPT    Plan discussed with primary team.

## 2023-12-23 NOTE — OCCUPATIONAL THERAPY INITIAL EVALUATION ADULT - PERTINENT HX OF CURRENT PROBLEM, REHAB EVAL
79yo female admitted to [facility] for Revision LEFT THR. Pt. reports she had initial LEFT THR done with Dr. Archer in 2019 vs 2020? Pt. was doing well but within the last 2 weeks she dislocated three times. Pt. reports she was trying to put on socks or tie her shoes. She lives in [place] and was reduced at [facility]. Pt. went to see Dr. Archer, had xrays done and was told she needs revision (possibly a bigger head). Pt. has baseline neuropathy due to spine issues and history of drop foot. Pt. has been wearing knee immobilizer since dislocations. 77yo female admitted to [facility] for Revision LEFT THR. Pt. reports she had initial LEFT THR done with Dr. Archer in 2019 vs 2020? Pt. was doing well but within the last 2 weeks she dislocated three times. Pt. reports she was trying to put on socks or tie her shoes. She lives in [place] and was reduced at [facility]. Pt. went to see Dr. Archer, had xrays done and was told she needs revision (possibly a bigger head). Pt. has baseline neuropathy due to spine issues and history of drop foot. Pt. has been wearing knee immobilizer since dislocations.

## 2023-12-23 NOTE — PROGRESS NOTE ADULT - SUBJECTIVE AND OBJECTIVE BOX
Ortho Note    Pt comfortable without complaints, pain controlled  Denies CP, SOB, N/V, numbness/tingling     Vital Signs Last 24 Hrs  T(C): 36.9 (12-23-23 @ 09:05), Max: 36.9 (12-23-23 @ 09:05)  T(F): 98.4 (12-23-23 @ 09:05), Max: 98.4 (12-23-23 @ 09:05)  HR: 76 (12-23-23 @ 09:05) (76 - 76)  BP: 132/69 (12-23-23 @ 09:05) (132/69 - 132/69)  BP(mean): --  RR: 16 (12-23-23 @ 09:05) (16 - 16)  SpO2: 99% (12-23-23 @ 09:05) (99% - 99%)  I&O's Summary    22 Dec 2023 07:01  -  23 Dec 2023 07:00  --------------------------------------------------------  IN: 2495 mL / OUT: 1240 mL / NET: 1255 mL        Physical Exam:  General: Pt Alert and oriented, NAD  LLE:  DSG C/D/I , xeroform, gauze, tegaderm  Pulses: 2+dp, pt pulses, wwp, cap refill <3 sec  Sensation: SILT in sural/saph/sp/dp/tibial distributions  Motor: EHL/FHL/TA/GS  firing                            10.6   9.08  )-----------( 195      ( 23 Dec 2023 06:23 )             31.4     12-23    140  |  105  |  14  ----------------------------<  144<H>  3.9   |  25  |  0.61    Ca    9.0      23 Dec 2023 06:24      A/P: 78yFemale s/p L MATTIE with Dr. Archer on 12/22  - Stable  - Pain Control  - f/u AM labs  - DVT ppx: SCDs, ASA  - Post op abx: periop ancef  - PT, WBS: WBATm anterior/posterior hip precautions      Ortho Pager [phone] Ortho Note    Pt comfortable without complaints, pain controlled  Denies CP, SOB, N/V, numbness/tingling     Vital Signs Last 24 Hrs  T(C): 36.9 (12-23-23 @ 09:05), Max: 36.9 (12-23-23 @ 09:05)  T(F): 98.4 (12-23-23 @ 09:05), Max: 98.4 (12-23-23 @ 09:05)  HR: 76 (12-23-23 @ 09:05) (76 - 76)  BP: 132/69 (12-23-23 @ 09:05) (132/69 - 132/69)  BP(mean): --  RR: 16 (12-23-23 @ 09:05) (16 - 16)  SpO2: 99% (12-23-23 @ 09:05) (99% - 99%)  I&O's Summary    22 Dec 2023 07:01  -  23 Dec 2023 07:00  --------------------------------------------------------  IN: 2495 mL / OUT: 1240 mL / NET: 1255 mL        Physical Exam:  General: Pt Alert and oriented, NAD  LLE:  DSG C/D/I , xeroform, gauze, tegaderm  Pulses: 2+dp, pt pulses, wwp, cap refill <3 sec  Sensation: SILT in sural/saph/sp/dp/tibial distributions  Motor: EHL/FHL/TA/GS  firing                            10.6   9.08  )-----------( 195      ( 23 Dec 2023 06:23 )             31.4     12-23    140  |  105  |  14  ----------------------------<  144<H>  3.9   |  25  |  0.61    Ca    9.0      23 Dec 2023 06:24      A/P: 78yFemale s/p L MATTIE with Dr. Archer on 12/22  - Stable  - Pain Control  - f/u AM labs  - DVT ppx: SCDs, ASA  - Post op abx: periop ancef  - PT, WBS: WBATm anterior/posterior hip precautions      Ortho Pager [phone]

## 2023-12-23 NOTE — DISCHARGE NOTE NURSING/CASE MANAGEMENT/SOCIAL WORK - NSDCPEFALRISK_GEN_ALL_CORE
For information on Fall & Injury Prevention, visit: [email or web address] OR  [email or web address] OR  https://www.cdc.gov/steadi/patient.html For information on Fall & Injury Prevention, visit: [email or web address] OR  [email or web address] OR  https://www.cdc.gov/steadi/patient.html

## 2023-12-23 NOTE — DISCHARGE NOTE PROVIDER - HOSPITAL COURSE
78y Female s/p revision left hip replacement- head and liner exchange due to instability  PMHx: HTN, stroke, spinal stenosis, HLD, depression  Resident:  WBS: WBAT with anterior and posterior hip precautions. No abduction pillow.  DVT ppx:  DSG: Xeroform, gauze, tegaderm  Abx: Ancef  Consultants:  Dispo:  Events:  12/22 OR  Anterior and posterior hip precautions

## 2023-12-23 NOTE — CONSULT NOTE ADULT - SUBJECTIVE AND OBJECTIVE BOX
HPI: 78 YOF with PMH of HTN, HOLD, OA, spinal stenosis, stroke, MDD, prior L hip replacement admitted for elective L hip revision s/p OR with Dr. Archer 12/22. Feels well this morning. Has some painin L hip but tolerable. Worked with PT and advised CELSO but she would prefer to go home (requested oxycodone on dsicharge - primary team notified). Denies fever, vision changes, hearing changes, rhinorrhea, sore throat, chest pain, palpitations, cough, SOB, abd pain, N/V/D, dysuria, hematuria, rash, numbness/weakness/tingling, HA, LOC.    ROS: negative except as per HPI    PMH: as per HPI  PSH: as per HPI  Medications: reviewed  Allergies: reviewed  SH:  FH:    Diet, Regular (12-22-23 @ 15:31) [Active]      MEDICATIONS:  MEDICATIONS  (STANDING):  acetaminophen     Tablet .. 1000 milliGRAM(s) Oral every 8 hours  amLODIPine   Tablet 2.5 milliGRAM(s) Oral daily  aspirin enteric coated 81 milliGRAM(s) Oral two times a day  atorvastatin 40 milliGRAM(s) Oral at bedtime  celecoxib 100 milliGRAM(s) Oral every 12 hours  escitalopram 10 milliGRAM(s) Oral daily  famotidine    Tablet 20 milliGRAM(s) Oral daily  lactated ringers. 1000 milliLiter(s) (75 mL/Hr) IV Continuous <Continuous>  metoprolol succinate  milliGRAM(s) Oral daily  multivitamin 1 Tablet(s) Oral daily  polyethylene glycol 3350 17 Gram(s) Oral at bedtime  senna 2 Tablet(s) Oral at bedtime    MEDICATIONS  (PRN):  magnesium hydroxide Suspension 30 milliLiter(s) Oral daily PRN Constipation  ondansetron Injectable 4 milliGRAM(s) IV Push every 6 hours PRN Nausea and/or Vomiting  oxyCODONE    IR 5 milliGRAM(s) Oral every 6 hours PRN Severe Pain (7 - 10)  oxyCODONE    IR 2.5 milliGRAM(s) Oral every 4 hours PRN Moderate Pain (4 - 6)      Allergies    Demerol (Nausea)    Intolerances        OBJECTIVE:  Vital Signs Last 24 Hrs  T(C): 37 (23 Dec 2023 16:17), Max: 37 (23 Dec 2023 16:17)  T(F): 98.6 (23 Dec 2023 16:17), Max: 98.6 (23 Dec 2023 16:17)  HR: 63 (23 Dec 2023 16:17) (63 - 88)  BP: 117/58 (23 Dec 2023 16:17) (105/64 - 144/84)  BP(mean): --  RR: 17 (23 Dec 2023 16:17) (16 - 18)  SpO2: 96% (23 Dec 2023 16:17) (96% - 99%)    Parameters below as of 23 Dec 2023 16:17  Patient On (Oxygen Delivery Method): room air      I&O's Summary    22 Dec 2023 07:01  -  23 Dec 2023 07:00  --------------------------------------------------------  IN: 2495 mL / OUT: 1240 mL / NET: 1255 mL        PHYSICAL EXAM:  Gen: appears stated age, resting comfortably, NAD  HEENT: NCAT, MMM, clear OP  Neck: supple  CV: RRR, no m/r/g, peripheral pulses 2+  Pulm: CTAB, no increased work of breathing, no rales/rhonchi  Abd: soft, ND, NT, no rebound or guarding  Skin: warm and dry  Ext: non-tender, no edema; L hip dressing CDI  Neuro: AOx3, speaking in full sentences  Psych: affect and behavior appropriate, pleasant at time of interview    LABS:                        10.6   9.08  )-----------( 195      ( 23 Dec 2023 06:23 )             31.4     12-23    140  |  105  |  14  ----------------------------<  144<H>  3.9   |  25  |  0.61    Ca    9.0      23 Dec 2023 06:24          CAPILLARY BLOOD GLUCOSE        Urinalysis Basic - ( 23 Dec 2023 06:24 )    Color: x / Appearance: x / SG: x / pH: x  Gluc: 144 mg/dL / Ketone: x  / Bili: x / Urobili: x   Blood: x / Protein: x / Nitrite: x   Leuk Esterase: x / RBC: x / WBC x   Sq Epi: x / Non Sq Epi: x / Bacteria: x        MICRODATA:    Culture - Aspirate with Gram Stain (collected 22 Dec 2023 14:46)  Source: .Aspirate 2: Left Hip Aspiration Culture  Gram Stain (22 Dec 2023 16:41):    No organisms seen    Rare White blood cells  Preliminary Report (23 Dec 2023 08:32):    No growth to date    Culture - Aspirate with Gram Stain (collected 22 Dec 2023 14:46)  Source: .Aspirate 1: Left Hip Aspiration Culture  Gram Stain (22 Dec 2023 16:41):    No organisms seen    Rare WBC's  Preliminary Report (23 Dec 2023 08:26):    No growth to date        RADIOLOGY/OTHER STUDIES:

## 2023-12-23 NOTE — OCCUPATIONAL THERAPY INITIAL EVALUATION ADULT - MD ORDER
Pain in hip region after hip replacement  Instability of prosthetic hip  Now s/p Open revision of liner in left hip joint (L MATTIE) on 12/22/23

## 2023-12-23 NOTE — DISCHARGE NOTE PROVIDER - CARE PROVIDER_API CALL
Jos Archer  Orthopaedic Surgery  [address]  [place] [address]  Phone: [phone]  Fax: [phone]  Follow Up Time:    Jos Archer  Orthopaedic Surgery  [address]  [place] [address]  Phone: [phone]  Fax: [phone]  Follow Up Time:

## 2023-12-23 NOTE — DISCHARGE NOTE PROVIDER - NSDCFUADDINST_GEN_ALL_CORE_FT
ACTIVITY:     - Bear weight as tolerated     - When at rest, keep the  extremity elevated, a on multiple pillows.     - You may experience postoperative swelling on the operative extremity. You may ice the surgery site for 20 minute intervals every hour.     __________________________     DRESSING:     - Maintain your Dressign. Do not remove unless instructed by your surgeon. - For any questions or concerns regarding incision or dressing care, call your surgeon's office.     __________________________     MEDICATION/ANTICOAGULATION:     - You have been prescribed medications for pain:      - Tylenol for mild to moderate pain. Do not exceed 3,000mg daily.      - Narcotic pain medication. For more severe pain, take Tylenol with the addition of narcotic pain medication. Take this medication as prescribed. This medication may cause drowsiness or dizziness. Do not operate machinery. This medication may cause constipation.     - Try to have regular bowel movements. Take stool softener or laxative if necessary. You may wish to take Miralax (purchased over the counter) daily until you have regular bowel movements.      - GI protection. Please take Pantoprazole once a day, before breakfast, until no longer taking Aspirin, blood thinner, or anti-inflammatory. This will help protect your stomach.     - For any additional medications, follow instructions on the bottle.      - If you have a pain management physician, please follow-up with them postoperatively.      - If you experience any negative side effects of your medications, please call your surgeon's office to discuss.     __________________________     Follow-up:     - Call to schedule an appt with  within 1-2 weeks postop for follow up. If you have staples or sutures they will be removed in office.     - Please follow-up with your primary care physician or any other specialist you see postoperatively, if needed.      - Contact your doctor if you experience: fever greater than 101.5, chills, chest pain, difficulty breathing, redness or excessive drainage around the incision, other concerns.

## 2023-12-23 NOTE — OCCUPATIONAL THERAPY INITIAL EVALUATION ADULT - GENERAL OBSERVATIONS, REHAB EVAL
Pt's RN Gala aware of intent to eval/tx; cleared Pt. Pt received EOB - +heplock, L hip dressing. Pt agreeable to OT.

## 2023-12-23 NOTE — DISCHARGE NOTE PROVIDER - NSDCMRMEDTOKEN_GEN_ALL_CORE_FT
Aleve 220 mg oral tablet: 1 tab(s) orally prn  aspirin 81 mg oral tablet: orally once a day  atorvastatin 40 mg oral tablet: 1 tab(s) orally once a day  Lexapro 10 mg oral tablet: 1 tab(s) orally once a day  metoprolol succinate 100 mg oral tablet, extended release: 1 tab(s) orally once a day  metoprolol tartrate 50 mg oral tablet: 1 tab(s) orally 2 times a day  Norvasc 2.5 mg oral tablet: 1 tab(s) orally once a day  Tylenol 500 mg oral tablet: 2 tab(s) orally prn   acetaminophen 650 mg oral tablet, extended release: 2 tab(s) orally every 6 hours  Aleve 220 mg oral tablet: 1 tab(s) orally prn  aspirin 81 mg oral tablet: orally once a day  aspirin 81 mg oral tablet, chewable: 1 tab(s) chewed 2 times a day  atorvastatin 40 mg oral tablet: 1 tab(s) orally once a day  CeleBREX 200 mg oral capsule: 1 cap(s) orally once a day  Colace 100 mg oral capsule: 1 cap(s) orally 3 times a day as needed for  constipation  cyclobenzaprine 10 mg oral tablet: 1 tab(s) orally every 8 hours as needed for muscle spasm  metoprolol succinate 100 mg oral tablet, extended release: 1 tab(s) orally once a day  metoprolol tartrate 50 mg oral tablet: 1 tab(s) orally 2 times a day  Norvasc 2.5 mg oral tablet: 1 tab(s) orally once a day  ondansetron 4 mg oral tablet: 1 tab(s) orally every 6 hours as needed for  nausea  Percocet 5 mg-325 mg oral tablet: 1 tab(s) orally every 6 hours as needed for  severe pain MDD: 4 tablets  Physical Therapy: Home Physical Therapy

## 2023-12-23 NOTE — OCCUPATIONAL THERAPY INITIAL EVALUATION ADULT - NSOTDMEREC_GEN_A_CORE
Pt given hip kit and 3 in one commode. Shower chair/tub transfer bench recommended- Pt agreed to purchase DME independently from [employer] (as demoed through website) Pt given hip kit and 3 in one commode. Shower chair/tub transfer bench recommended- Pt agreed to purchase DME independently from [email or web address] (as demoed through website)

## 2023-12-23 NOTE — DISCHARGE NOTE NURSING/CASE MANAGEMENT/SOCIAL WORK - PATIENT PORTAL LINK FT
You can access the FollowMyHealth Patient Portal offered by [facility] by registering at the following website: [email or web address]. By joining [employer]’s FollowMyHealth portal, you will also be able to view your health information using other applications (apps) compatible with our system. You can access the FollowMyHealth Patient Portal offered by [facility] by registering at the following website: [email or web address]. By joining [facility]’s FollowMyHealth portal, you will also be able to view your health information using other applications (apps) compatible with our system.

## 2023-12-23 NOTE — OCCUPATIONAL THERAPY INITIAL EVALUATION ADULT - ADDITIONAL COMMENTS
Pt lives alone in apt w/ elevator access. Pt states that she was independent prior to admission. Pt used a cane for ambulation. No other DMEs/AEs reported.

## 2023-12-26 RX ORDER — OXYCODONE 5 MG/1
5 TABLET ORAL
Qty: 20 | Refills: 0 | Status: ACTIVE | COMMUNITY
Start: 2023-12-26 | End: 1900-01-01

## 2023-12-28 DIAGNOSIS — G62.9 POLYNEUROPATHY, UNSPECIFIED: ICD-10-CM

## 2023-12-28 DIAGNOSIS — K21.9 GASTRO-ESOPHAGEAL REFLUX DISEASE WITHOUT ESOPHAGITIS: ICD-10-CM

## 2023-12-28 DIAGNOSIS — D62 ACUTE POSTHEMORRHAGIC ANEMIA: ICD-10-CM

## 2023-12-28 DIAGNOSIS — Z79.899 OTHER LONG TERM (CURRENT) DRUG THERAPY: ICD-10-CM

## 2023-12-28 DIAGNOSIS — Z86.73 PERSONAL HISTORY OF TRANSIENT ISCHEMIC ATTACK (TIA), AND CEREBRAL INFARCTION WITHOUT RESIDUAL DEFICITS: ICD-10-CM

## 2023-12-28 DIAGNOSIS — E78.5 HYPERLIPIDEMIA, UNSPECIFIED: ICD-10-CM

## 2023-12-28 DIAGNOSIS — I10 ESSENTIAL (PRIMARY) HYPERTENSION: ICD-10-CM

## 2023-12-28 DIAGNOSIS — M19.90 UNSPECIFIED OSTEOARTHRITIS, UNSPECIFIED SITE: ICD-10-CM

## 2023-12-28 DIAGNOSIS — Z88.5 ALLERGY STATUS TO NARCOTIC AGENT: ICD-10-CM

## 2023-12-28 DIAGNOSIS — T84.021A DISLOCATION OF INTERNAL LEFT HIP PROSTHESIS, INITIAL ENCOUNTER: ICD-10-CM

## 2023-12-28 DIAGNOSIS — Z96.651 PRESENCE OF RIGHT ARTIFICIAL KNEE JOINT: ICD-10-CM

## 2023-12-28 DIAGNOSIS — F32.9 MAJOR DEPRESSIVE DISORDER, SINGLE EPISODE, UNSPECIFIED: ICD-10-CM

## 2023-12-28 DIAGNOSIS — Z79.82 LONG TERM (CURRENT) USE OF ASPIRIN: ICD-10-CM

## 2023-12-28 DIAGNOSIS — M48.00 SPINAL STENOSIS, SITE UNSPECIFIED: ICD-10-CM

## 2023-12-28 DIAGNOSIS — Z79.1 LONG TERM (CURRENT) USE OF NON-STEROIDAL ANTI-INFLAMMATORIES (NSAID): ICD-10-CM

## 2023-12-29 ENCOUNTER — RESULT REVIEW (OUTPATIENT)
Age: 78
End: 2023-12-29

## 2023-12-29 ENCOUNTER — APPOINTMENT (OUTPATIENT)
Dept: ORTHOPEDIC SURGERY | Facility: CLINIC | Age: 78
End: 2023-12-29
Payer: MEDICARE

## 2023-12-29 ENCOUNTER — OUTPATIENT (OUTPATIENT)
Dept: OUTPATIENT SERVICES | Facility: HOSPITAL | Age: 78
LOS: 1 days | End: 2023-12-29
Payer: MEDICARE

## 2023-12-29 VITALS
WEIGHT: 137 LBS | DIASTOLIC BLOOD PRESSURE: 80 MMHG | BODY MASS INDEX: 27.62 KG/M2 | HEIGHT: 59 IN | SYSTOLIC BLOOD PRESSURE: 134 MMHG | OXYGEN SATURATION: 96 % | HEART RATE: 89 BPM

## 2023-12-29 DIAGNOSIS — Z96.659 PRESENCE OF UNSPECIFIED ARTIFICIAL KNEE JOINT: Chronic | ICD-10-CM

## 2023-12-29 DIAGNOSIS — Z96.649 DISLOCATION OF UNSPECIFIED INTERNAL JOINT PROSTHESIS, INITIAL ENCOUNTER: ICD-10-CM

## 2023-12-29 DIAGNOSIS — Z98.890 OTHER SPECIFIED POSTPROCEDURAL STATES: Chronic | ICD-10-CM

## 2023-12-29 DIAGNOSIS — T84.029A DISLOCATION OF UNSPECIFIED INTERNAL JOINT PROSTHESIS, INITIAL ENCOUNTER: ICD-10-CM

## 2023-12-29 PROBLEM — M19.90 UNSPECIFIED OSTEOARTHRITIS, UNSPECIFIED SITE: Chronic | Status: ACTIVE | Noted: 2023-12-21

## 2023-12-29 PROBLEM — M48.00 SPINAL STENOSIS, SITE UNSPECIFIED: Chronic | Status: ACTIVE | Noted: 2023-12-21

## 2023-12-29 PROBLEM — E78.5 HYPERLIPIDEMIA, UNSPECIFIED: Chronic | Status: ACTIVE | Noted: 2023-12-21

## 2023-12-29 PROBLEM — I10 ESSENTIAL (PRIMARY) HYPERTENSION: Chronic | Status: ACTIVE | Noted: 2023-12-21

## 2023-12-29 PROBLEM — Z86.59 PERSONAL HISTORY OF OTHER MENTAL AND BEHAVIORAL DISORDERS: Chronic | Status: ACTIVE | Noted: 2023-12-21

## 2023-12-29 PROCEDURE — 73521 X-RAY EXAM HIPS BI 2 VIEWS: CPT

## 2023-12-29 PROCEDURE — 99024 POSTOP FOLLOW-UP VISIT: CPT

## 2023-12-29 PROCEDURE — 73521 X-RAY EXAM HIPS BI 2 VIEWS: CPT | Mod: 26

## 2023-12-29 NOTE — HISTORY OF PRESENT ILLNESS
[de-identified] : Patient is a 78-year-old woman who is 1 week status post left hip revision.  This was a conversion to a dual mobility construct due to repetitive instability of the left hip.  She feels well in that hip generally.  Her pain is improving.  She is using only Tylenol for pain.  She comes in because her right hip has had an increase in pain since the surgery.  She is not using any assistive devices but is using a rolling duffel bag for some support.  She is at home and has home PT.  She denies fevers or chills.  No chest pain or shortness of breath.  She is taking her aspirin as instructed for VTE prophylaxis. [de-identified] : On exam she is alert and oriented.  She is able to walk with a short stride gait with no assistive devices with some antalgia on the right side.  Her left hip incision is clean dry and intact with no erythema or drainage.  She is neurologically intact.  There is pain with range of motion of the right hip [de-identified] : 4 views of the right hip show severe arthritis with broad bone-on-bone apposition.  Views of the left hip show a hip prosthesis with no evidence for any hardware complication loosening fracture or dislocation. [de-identified] : 1 week status post left hip revision with exacerbation of right hip arthritis.  I had a long discussion with the patient regarding hip arthritis / degenerative disease and treatment options. We reviewed the nature and etiology of degenerative hip disease.  We discussed the spectrum of symptomatic treatments. Our discussion included the use of appropriate exercises, activity modifications, weight reduction and maintenance, and appropriate medication use.  She is interested in total hip arthroplasty but I think it is too soon given her recent hip revision we discussed that.  I will provide her with some pain pills to assist with her recovery.  Will go to see her in 1 week to remove her sutures as I think it is too soon to do that at this time.  She is in agreement with the plan.  All questions answered.

## 2024-01-10 ENCOUNTER — APPOINTMENT (OUTPATIENT)
Dept: ORTHOPEDIC SURGERY | Facility: CLINIC | Age: 79
End: 2024-01-10
Payer: MEDICARE

## 2024-01-10 PROCEDURE — 99024 POSTOP FOLLOW-UP VISIT: CPT

## 2024-01-10 RX ORDER — ACETAMINOPHEN 650 MG/1
650 TABLET, EXTENDED RELEASE ORAL 4 TIMES DAILY
Qty: 180 | Refills: 0 | Status: ACTIVE | COMMUNITY
Start: 2024-01-10 | End: 1900-01-01

## 2024-01-10 NOTE — HISTORY OF PRESENT ILLNESS
[de-identified] : s/p revision left hip replacement and right hip OA [de-identified] : CHIVO VARELA is known to me for s/p revision left hip replacement (head and liner exchange to [facility]) on date of surgery: 12/22/23 Since we last saw them, she is doing well from a left hip standpoint but her right hip pain continues to be severe. She's taking is well managed and is taking Tylenol 650mg x2 every 8 hours. She  is taking Aspirin 81mg twice a day and Celebrex as directed. She has completed home PT. She is using no device to ambulate. Denies any fevers and chills or other signs of infection.   [de-identified] : General: AxO x 3   Neuro: Intact with foot eversion, foot invers is 5 out of 5 with the exception of dorsiflexion of the left which is 4+ out of 5 consistent with her baseline ion, dorsiflexion, plantar flexion, sensation is intact over the lower extremity in L2-S1 nerve distributions. 2+ dorsalis pedis and posterior tibial pulses. Negative Homans sign   Skin: incision healing well, appropriate erythema, no signs of infection, sutures removed   Hip: Appropriate post-operative swelling. No instability appreciated through gentle ROM.    [de-identified] : I do not see any obvious abnormality on right hip MRI although severe degenerative disease but the quality seems to reflect artifact or motion [de-identified] : 3 weeks S/P revision left total hip replacement, progressing well.  She is much more concerned about her right hip and had a primary care order MRI.  I will see any obvious abnormality although there does appear to be significant artifact so we will obtain the report to review the radiologist interpretation.  She is interested in total hip arthroplasty but I would want further recovery from her current surgery we discussed that at length.  Symptomatic and pain relief, range of motion, activity advancement and precaution strategies reviewed, including use of over-the-counter medications as needed.  Provided an outpatient physical therapy prescription to her we provided information regarding the need for antibiotic prophylaxis for future dental or invasive procedures. We will follow up as scheduled in 6 weeks at which time we can begin to plan for the right hip if everything remains as expected with her recovery, but advised them to return sooner if they develops any concerns for an evaluation. [de-identified] : I, Dr. Archer, personally performed the evaluation and management (E/M) services for this patient. That E/M includes conducting the clinically appropriate initial history &/or exam, assessing all conditions, and establishing the plan of care. Today, my JONI, Rosa M Diamond, was here to observe my evaluation and management service for this patient & follow plan of care established by me going forward.

## 2024-01-12 LAB
CULTURE RESULTS: NO GROWTH — SIGNIFICANT CHANGE UP
SPECIMEN SOURCE: SIGNIFICANT CHANGE UP

## 2024-01-20 LAB
CULTURE RESULTS: SIGNIFICANT CHANGE UP
CULTURE RESULTS: SIGNIFICANT CHANGE UP
SPECIMEN SOURCE: SIGNIFICANT CHANGE UP
SPECIMEN SOURCE: SIGNIFICANT CHANGE UP

## 2024-02-21 ENCOUNTER — APPOINTMENT (OUTPATIENT)
Dept: ORTHOPEDIC SURGERY | Facility: CLINIC | Age: 79
End: 2024-02-21
Payer: MEDICARE

## 2024-02-21 VITALS
DIASTOLIC BLOOD PRESSURE: 88 MMHG | HEART RATE: 77 BPM | BODY MASS INDEX: 27.62 KG/M2 | SYSTOLIC BLOOD PRESSURE: 167 MMHG | WEIGHT: 137 LBS | OXYGEN SATURATION: 95 % | HEIGHT: 59 IN

## 2024-02-21 PROCEDURE — 73562 X-RAY EXAM OF KNEE 3: CPT | Mod: RT

## 2024-02-21 PROCEDURE — 99024 POSTOP FOLLOW-UP VISIT: CPT

## 2024-02-21 PROCEDURE — 73502 X-RAY EXAM HIP UNI 2-3 VIEWS: CPT

## 2024-02-21 NOTE — REASON FOR VISIT
[Follow-Up Visit] : a follow-up visit for [FreeTextEntry2] : s/p revision left hip replacement, right hip pain, right knee clicking

## 2024-02-21 NOTE — PHYSICAL EXAM
[de-identified] : General: AxO x 3; left hip incision well-healed.  Right knee incision well-healed.  She is walking with some antalgia on the right.   Neuro: 5/5 strength with foot eversion, foot inversion, dorsiflexion, plantar flexion, sensation is intact over the lower extremity in L2-S1 nerve distributions. 2+ dorsalis pedis and posterior tibial pulses. Negative Homans sign   Hip: well healed surgical scar on the left. No instability appreciated through gentle ROM She has significant pain with range of motion of the right hip and a positive Stinchfield.  Knee: well healed surgical scar ROM: 0-125 with clicking with ROM. No AP or VV instability  [de-identified] : 3 views of the right knee show well aligned arthroplasty with no hardware complication loosening fracture or dislocation. 3 views of the left hip show a revision prosthesis with no hardware complication loosening fracture or dislocation.  There is a dual mobility construct.  There is severe arthritis of the right hip with broad bone-on-bone apposition

## 2024-02-21 NOTE — DISCUSSION/SUMMARY
[de-identified] : Status post left hip revision for dislocation doing well. The etiology of the clicking in the right knee is unclear but I do not see any evidence for hardware complication or fracture and therefore I would observe for now as there is no significant pain related to it.  We discussed the right hip arthritis at length.  She feels strongly she would like surgical treatment.  I think she has recovered enough on the left.  We discussed the procedure risk benefits and expected outcomes at length.  Surgical risks discussed are shown below.  Category 1 includes risks that could occur in association with any operation. They include heart attack, stroke, blood clot and pulmonary embolism, wound infection, transfusion reaction, drug allergy, and complications related to anesthesia. This list is not intended to be complete but only to convey a broad range of general medical risks to be aware of.  Blood clots may lead to a block in circulation. A blood clot that completely blocks a large artery can lead to gangrene. If this happens an amputation may be required. Blood clots in leg veins lead to pain and swelling. If part of the clot breaks off it can travel to the brain and lead to a stroke. A clot can also travel to the lung, resulting in a pulmonary embolism. Medication after surgery will minimize but not eliminate these complications.  Category 2 is a list of risks and complications specifically related to total or partial joint replacement. This list is not complete but is intended to make the patient aware of the kinds of implant-related risks and complications that might occur.    1. If the device gets loose or wears out further surgery may be required to revise the prosthesis. 2. If an infection develops, further surgery to washout the joint, remove or replace parts, or insert an antibiotic spacer may be required 3. Muscle, Tendon, Nerve and blood vessel damage may result as a consequence of mobilization of the joint or dissection near these structures. These injuries can lead to weakness, numbness and paralysis. The damage may be temporary or permanent. The recovery process can be long and may require further procedures.   4. Dislocations and instability may also require further surgery.   5. Periprosthetic fracture requiring revision surgery. 6. Leg length discrepancy  7. Stiffness 8. Wound complications requiring either local wound care, revision surgery, or plastic surgery consultation  9. Chronic pain requiring pain management  She feels strongly she like to proceed.  Will plan a posterior approach with robotic assistance.  We discussed the need for CT scan as well as accessory incisions.    We will use robotic assistance to help with positioning given the spine issues which I think is contributing to the instability on the left.  May consider normal contractility construct as well.  Implant will be slightly different model in shape than the 1 she has in place and we discussed that.  Would like to have a home discharge but she has minimal support so we will use rehab if needed.  Importance of fall precautions also discussed at length uses a rolling for herself does not want additional assistive device at this time.  Will plan to move ahead with right total hip replacement likely sometime in April.   Plan: Right robotically assisted total hip arthroplasty.  Equipment: [employer] robot Crawford Trident cup and insignia stem with dual mobility backup.  Evaluations: KODI CT; PCP  I, Dr. Archer, personally performed the evaluation and management (E/M) services for this patient. That E/M includes conducting the clinically appropriate initial history &/or exam, assessing all conditions, and establishing the plan of care. Today, my JONI, [employer], was here to observe my evaluation and management service for this patient & follow plan of care established by me going forward.

## 2024-02-21 NOTE — HISTORY OF PRESENT ILLNESS
[de-identified] : CHIVO VARELA is known to me for s/p revision left hip replacement (head and liner exchange to [facility]) on date of surgery: 12/22/23. She is also known to me for right hip osteoarthritis and is s/p right knee replacement in 2022. Since we last saw them, she is doing well from a left hip standpoint, but her right hip pain continues to be severe. She notes a fall onto her face a few days ago and she broke her nose. She notes she falls frequently. She also notes a clicking in her right knee over the last 2 months. She is using no device to ambulate. Denies any fevers and chills or other signs of infection.  Her biggest issue continues to be right hip pain.  She feels "I cannot live like this.".  She has pain in the groin that radiates down the thigh as well as the lateral aspect of the hip.

## 2024-03-13 ENCOUNTER — APPOINTMENT (OUTPATIENT)
Dept: ORTHOPEDIC SURGERY | Facility: CLINIC | Age: 79
End: 2024-03-13
Payer: MEDICARE

## 2024-03-13 ENCOUNTER — APPOINTMENT (OUTPATIENT)
Dept: CT IMAGING | Facility: CLINIC | Age: 79
End: 2024-03-13
Payer: MEDICARE

## 2024-03-13 ENCOUNTER — OUTPATIENT (OUTPATIENT)
Dept: OUTPATIENT SERVICES | Facility: HOSPITAL | Age: 79
LOS: 1 days | End: 2024-03-13

## 2024-03-13 VITALS
BODY MASS INDEX: 27.62 KG/M2 | HEART RATE: 84 BPM | SYSTOLIC BLOOD PRESSURE: 153 MMHG | DIASTOLIC BLOOD PRESSURE: 85 MMHG | OXYGEN SATURATION: 97 % | HEIGHT: 59 IN | WEIGHT: 137 LBS

## 2024-03-13 DIAGNOSIS — Z96.651 PRESENCE OF RIGHT ARTIFICIAL KNEE JOINT: ICD-10-CM

## 2024-03-13 DIAGNOSIS — Z96.659 PRESENCE OF UNSPECIFIED ARTIFICIAL KNEE JOINT: Chronic | ICD-10-CM

## 2024-03-13 DIAGNOSIS — Z98.890 OTHER SPECIFIED POSTPROCEDURAL STATES: Chronic | ICD-10-CM

## 2024-03-13 DIAGNOSIS — Z96.642 PRESENCE OF LEFT ARTIFICIAL HIP JOINT: Chronic | ICD-10-CM

## 2024-03-13 DIAGNOSIS — M16.11 UNILATERAL PRIMARY OSTEOARTHRITIS, RIGHT HIP: ICD-10-CM

## 2024-03-13 PROCEDURE — 73502 X-RAY EXAM HIP UNI 2-3 VIEWS: CPT | Mod: LT

## 2024-03-13 PROCEDURE — 99024 POSTOP FOLLOW-UP VISIT: CPT

## 2024-03-13 PROCEDURE — 73700 CT LOWER EXTREMITY W/O DYE: CPT | Mod: 26,RT,MH

## 2024-03-13 NOTE — DISCUSSION/SUMMARY
[de-identified] : Status post left total hip revision.  She has noted some mild discomfort with some positions such as crossing her leg and we reviewed appropriate precautions for her.  She is reassured by the x-ray findings and exam which do show tach prosthesis.  She continues to be bothered by severe right hip pain and is anxious to proceed with surgery.  We reviewed the unique nature of her procedure in terms of its risks and recovery.  We Argun use a robotic assisted approach so that we can use 3D modeling to include evaluation of her spine to try to lower her dislocation risk.    Category 1 includes risks that could occur in association with any operation. They include heart attack, stroke, blood clot and pulmonary embolism, wound infection, transfusion reaction, drug allergy, and complications related to anesthesia. This list is not intended to be complete but only to convey a broad range of general medical risks to be aware of.  Blood clots may lead to a block in circulation. A blood clot that completely blocks a large artery can lead to gangrene. If this happens an amputation may be required. Blood clots in leg veins lead to pain and swelling. If part of the clot breaks off it can travel to the brain and lead to a stroke. A clot can also travel to the lung, resulting in a pulmonary embolism. Medication after surgery will minimize but not eliminate these complications.  Category 2 is a list of risks and complications specifically related to total or partial joint replacement. This list is not complete but is intended to make the patient aware of the kinds of implant-related risks and complications that might occur.    1. If the device gets loose or wears out further surgery may be required to revise the prosthesis. 2. If an infection develops, further surgery to washout the joint, remove or replace parts, or insert an antibiotic spacer may be required 3. Muscle, Tendon, Nerve and blood vessel damage may result as a consequence of mobilization of the joint or dissection near these structures. These injuries can lead to weakness, numbness and paralysis. The damage may be temporary or permanent. The recovery process can be long and may require further procedures.   4. Dislocations and instability may also require further surgery.   5. Periprosthetic fracture requiring revision surgery. 6. Leg length discrepancy  7. Stiffness 8. Wound complications requiring either local wound care, revision surgery, or plastic surgery consultation  9. Chronic pain requiring pain management   We discussed her recovery as well.  We reviewed the incision as well as the use of accessory incisions for the robotic assistance.  She expressed strong understanding and desire to proceed.  All questions answered.  I, Dr. Archer, personally performed the evaluation and management (E/M) services for this patient. That E/M includes conducting the clinically appropriate initial history &/or exam, assessing all conditions, and establishing the plan of care. Today, my JONI, [employer], was here to observe my evaluation and management service for this patient & follow plan of care established by me going forward.

## 2024-03-13 NOTE — PHYSICAL EXAM
[de-identified] : General: AxO x 3; left hip incision well-healed.  Right knee incision well-healed.  She is walking with some antalgia on the right.   Neuro: 5/5 strength with foot eversion, foot inversion, dorsiflexion, plantar flexion, sensation is intact over the lower extremity in L2-S1 nerve distributions. 2+ dorsalis pedis and posterior tibial pulses. Negative Homans sign   Hip: well healed surgical scar on the left. No instability appreciated through gentle ROM She has significant pain with range of motion of the right hip and a positive Stinchfield.  Knee: well healed surgical scar ROM: 0-125 with clicking with ROM. No AP or VV instability  [de-identified] : 3 views of the left hip are reviewed.  There is a revision arthroplasty with no hardware complication loosening fracture or dislocation.  There is a dual mobility construct in place.  There is severe arthritis of the right hip with complete loss of joint space noted.

## 2024-03-13 NOTE — HISTORY OF PRESENT ILLNESS
[de-identified] : CHIVO VARELA is known to me for s/p revision left hip replacement (head and liner exchange to [facility]) on date of surgery: 12/22/23. She is also known to me for right hip osteoarthritis and is s/p right knee replacement in 2022. Since we last saw them, she wants to have her left hip checked before her right hip replacement as she's noticed some pain with certain movements like bending at the hip or crossing her left leg over her right leg. She continues to have pain in her right hip and is ready to proceed with right hip replacement.

## 2024-03-13 NOTE — REVIEW OF SYSTEMS
[Arthralgia] : arthralgia [Joint Pain] : joint pain [Negative] : Respiratory [Fever] : no fever [Chills] : no chills

## 2024-03-14 LAB
25(OH)D3 SERPL-MCNC: 30.5 NG/ML
ALBUMIN SERPL ELPH-MCNC: 4.2 G/DL
ALP BLD-CCNC: 110 U/L
ALT SERPL-CCNC: 27 U/L
ANION GAP SERPL CALC-SCNC: 12 MMOL/L
AST SERPL-CCNC: 21 U/L
BASOPHILS # BLD AUTO: 0.03 K/UL
BASOPHILS NFR BLD AUTO: 0.4 %
BILIRUB SERPL-MCNC: 0.3 MG/DL
BUN SERPL-MCNC: 16 MG/DL
CALCIUM SERPL-MCNC: 9.4 MG/DL
CHLORIDE SERPL-SCNC: 101 MMOL/L
CO2 SERPL-SCNC: 28 MMOL/L
CREAT SERPL-MCNC: 0.56 MG/DL
EGFR: 95 ML/MIN/1.73M2
EOSINOPHIL # BLD AUTO: 0.4 K/UL
EOSINOPHIL NFR BLD AUTO: 4.9 %
GLUCOSE SERPL-MCNC: 97 MG/DL
HCT VFR BLD CALC: 37.5 %
HGB BLD-MCNC: 12 G/DL
IMM GRANULOCYTES NFR BLD AUTO: 0.6 %
LYMPHOCYTES # BLD AUTO: 2.1 K/UL
LYMPHOCYTES NFR BLD AUTO: 25.6 %
MAN DIFF?: NORMAL
MCHC RBC-ENTMCNC: 30.6 PG
MCHC RBC-ENTMCNC: 32 GM/DL
MCV RBC AUTO: 95.7 FL
MONOCYTES # BLD AUTO: 0.87 K/UL
MONOCYTES NFR BLD AUTO: 10.6 %
MRSA SPEC QL CULT: NEGATIVE
NEUTROPHILS # BLD AUTO: 4.75 K/UL
NEUTROPHILS NFR BLD AUTO: 57.9 %
PLATELET # BLD AUTO: 230 K/UL
POTASSIUM SERPL-SCNC: 4.2 MMOL/L
PREALB SERPL NEPH-MCNC: 30 MG/DL
PROT SERPL-MCNC: 6.6 G/DL
RBC # BLD: 3.92 M/UL
RBC # FLD: 12.4 %
SODIUM SERPL-SCNC: 141 MMOL/L
STAPH AUREUS (SA): NEGATIVE
TRANSFERRIN SERPL-MCNC: 259 MG/DL
WBC # FLD AUTO: 8.2 K/UL

## 2024-04-10 RX ORDER — OXYCODONE 5 MG/1
5 TABLET ORAL
Qty: 25 | Refills: 0 | Status: ACTIVE | COMMUNITY
Start: 2023-12-29 | End: 1900-01-01

## 2024-04-10 RX ORDER — ONDANSETRON 4 MG/1
4 TABLET, ORALLY DISINTEGRATING ORAL
Qty: 16 | Refills: 0 | Status: ACTIVE | COMMUNITY
Start: 2024-04-10 | End: 1900-01-01

## 2024-04-10 RX ORDER — FAMOTIDINE 20 MG/1
20 TABLET, FILM COATED ORAL
Qty: 30 | Refills: 0 | Status: ACTIVE | COMMUNITY
Start: 2024-04-10 | End: 1900-01-01

## 2024-04-10 RX ORDER — POLYETHYLENE GLYCOL 3350 17 G/17G
17 POWDER, FOR SOLUTION ORAL
Qty: 14 | Refills: 0 | Status: ACTIVE | COMMUNITY
Start: 2023-12-26 | End: 1900-01-01

## 2024-04-10 RX ORDER — ASPIRIN 81 MG/1
81 TABLET, DELAYED RELEASE ORAL
Qty: 60 | Refills: 0 | Status: ACTIVE | COMMUNITY
Start: 2023-12-26 | End: 1900-01-01

## 2024-04-10 RX ORDER — CELECOXIB 100 MG/1
100 CAPSULE ORAL
Qty: 60 | Refills: 0 | Status: ACTIVE | COMMUNITY
Start: 2023-12-26 | End: 1900-01-01

## 2024-04-15 NOTE — ASU PREOP CHECKLIST - HEIGHT IN FEET
17 y/o M with history of autism, ADHD, seizure d/o (on keppra), asthma presenting with 2d of fevers and URI symptoms admitted for acute hypoxic respiratory distress 2/2 acute asthma exacerbation in the setting of R/E.     Resp:  - q2h albuterol  - 2L NC  - dex x 1 (2am on 4/15)  - Mg x 1   - goal sats >92 when awake, >88% when asleep  - CXR (4/15):     ID:  - CTX (4/15 at 8am)    FEN/GI:  - D5NS at mIVF  - regular diet    Neuro: Hx seizures  - Keppra 2000BID (Home Med) 19 y/o M with history of autism, ADHD, seizure d/o (on keppra), asthma presenting with 2d of fevers and URI symptoms admitted for acute hypoxic respiratory distress 2/2 acute asthma exacerbation in the setting of R/E.     Resp:  - q2h albuterol  - 2L NC  - Flovent 44mcg BID  - s/p dex x 1 (2am on 4/15)  - s/p Mg x 1   - CXR (4/15):     ID:  - CTX (4/15 at 8am)    FEN/GI:  - D5NS at mIVF  - regular diet    Neuro: Hx seizures  - Keppra 2000BID (Home Med) 17 y/o M with history of autism, ADHD, seizure d/o (on keppra), asthma presenting with 2d of fevers and URI symptoms admitted for acute hypoxic respiratory distress 2/2 acute asthma exacerbation in the setting of R/E.     Resp:  - q2h albuterol  - 2L NC  - Flovent 44mcg BID  - s/p dex x 1 (2am on 4/15)  - s/p Mg x 1   - CXR (4/15):     ID:  - CTX (4/15 at 8am)- can likely switch to high dose amox    FEN/GI:  - D5NS at mIVF  - regular diet    Neuro: Hx seizures  - Keppra 2000BID (Home Med) 4

## 2024-04-18 ENCOUNTER — NON-APPOINTMENT (OUTPATIENT)
Age: 79
End: 2024-04-18

## 2024-04-22 ENCOUNTER — TRANSCRIPTION ENCOUNTER (OUTPATIENT)
Age: 79
End: 2024-04-22

## 2024-04-22 VITALS
WEIGHT: 131.4 LBS | OXYGEN SATURATION: 98 % | TEMPERATURE: 98 F | RESPIRATION RATE: 18 BRPM | HEART RATE: 75 BPM | SYSTOLIC BLOOD PRESSURE: 147 MMHG | HEIGHT: 59 IN | DIASTOLIC BLOOD PRESSURE: 77 MMHG

## 2024-04-22 RX ORDER — CELECOXIB 200 MG/1
100 CAPSULE ORAL EVERY 12 HOURS
Refills: 0 | Status: DISCONTINUED | OUTPATIENT
Start: 2024-04-23 | End: 2024-04-25

## 2024-04-22 RX ORDER — ASPIRIN/CALCIUM CARB/MAGNESIUM 324 MG
0 TABLET ORAL
Refills: 0 | DISCHARGE

## 2024-04-22 RX ORDER — SODIUM CHLORIDE 9 MG/ML
1000 INJECTION, SOLUTION INTRAVENOUS
Refills: 0 | Status: DISCONTINUED | OUTPATIENT
Start: 2024-04-23 | End: 2024-04-25

## 2024-04-22 RX ORDER — SENNA PLUS 8.6 MG/1
2 TABLET ORAL AT BEDTIME
Refills: 0 | Status: DISCONTINUED | OUTPATIENT
Start: 2024-04-23 | End: 2024-04-25

## 2024-04-22 RX ORDER — ACETAMINOPHEN 500 MG
1000 TABLET ORAL EVERY 8 HOURS
Refills: 0 | Status: DISCONTINUED | OUTPATIENT
Start: 2024-04-23 | End: 2024-04-25

## 2024-04-22 RX ORDER — FAMOTIDINE 10 MG/ML
20 INJECTION INTRAVENOUS DAILY
Refills: 0 | Status: DISCONTINUED | OUTPATIENT
Start: 2024-04-23 | End: 2024-04-25

## 2024-04-22 RX ORDER — POLYETHYLENE GLYCOL 3350 17 G/17G
17 POWDER, FOR SOLUTION ORAL AT BEDTIME
Refills: 0 | Status: DISCONTINUED | OUTPATIENT
Start: 2024-04-23 | End: 2024-04-25

## 2024-04-22 RX ORDER — CHLORHEXIDINE GLUCONATE 213 G/1000ML
1 SOLUTION TOPICAL EVERY 12 HOURS
Refills: 0 | Status: COMPLETED | OUTPATIENT
Start: 2024-04-23 | End: 2024-04-24

## 2024-04-22 RX ORDER — ONDANSETRON 8 MG/1
4 TABLET, FILM COATED ORAL EVERY 6 HOURS
Refills: 0 | Status: DISCONTINUED | OUTPATIENT
Start: 2024-04-23 | End: 2024-04-25

## 2024-04-22 RX ORDER — MAGNESIUM HYDROXIDE 400 MG/1
30 TABLET, CHEWABLE ORAL DAILY
Refills: 0 | Status: DISCONTINUED | OUTPATIENT
Start: 2024-04-23 | End: 2024-04-25

## 2024-04-22 RX ORDER — AMLODIPINE BESYLATE 2.5 MG/1
1 TABLET ORAL
Refills: 0 | DISCHARGE

## 2024-04-22 RX ORDER — POVIDONE-IODINE 5 %
1 AEROSOL (ML) TOPICAL ONCE
Refills: 0 | Status: COMPLETED | OUTPATIENT
Start: 2024-04-23 | End: 2024-04-23

## 2024-04-22 RX ORDER — METOPROLOL TARTRATE 50 MG
1 TABLET ORAL
Refills: 0 | DISCHARGE

## 2024-04-22 RX ORDER — ASPIRIN/CALCIUM CARB/MAGNESIUM 324 MG
81 TABLET ORAL
Refills: 0 | Status: DISCONTINUED | OUTPATIENT
Start: 2024-04-24 | End: 2024-04-25

## 2024-04-22 RX ORDER — ATORVASTATIN CALCIUM 80 MG/1
1 TABLET, FILM COATED ORAL
Refills: 0 | DISCHARGE

## 2024-04-22 RX ORDER — OXYCODONE HYDROCHLORIDE 5 MG/1
5 TABLET ORAL EVERY 6 HOURS
Refills: 0 | Status: DISCONTINUED | OUTPATIENT
Start: 2024-04-23 | End: 2024-04-25

## 2024-04-22 RX ORDER — HYDROMORPHONE HYDROCHLORIDE 2 MG/ML
0.5 INJECTION INTRAMUSCULAR; INTRAVENOUS; SUBCUTANEOUS ONCE
Refills: 0 | Status: COMPLETED | OUTPATIENT
Start: 2024-04-23 | End: 2024-04-30

## 2024-04-22 NOTE — H&P ADULT - NSHPLABSRESULTS_GEN_ALL_CORE
Preop CBC, BMP, within normal range and reviewed per medical clearance  H/H: 12.0/37.5  Cr: 0.56  Preop CXR within normal limits and reviewed per medical clearance   Preop EK NSR  3M: DOS

## 2024-04-22 NOTE — ASU PATIENT PROFILE, ADULT - NSICDXPASTSURGICALHX_GEN_ALL_CORE_FT
PAST SURGICAL HISTORY:  History of carpal tunnel release     Previous back surgery x2    S/P knee replacement right    S/P total left hip arthroplasty 2019     PAST SURGICAL HISTORY:  History of carpal tunnel release BILAT    Previous back surgery x2    S/P knee replacement right    S/P total left hip arthroplasty 2019

## 2024-04-22 NOTE — PRE-OP CHECKLIST - MUPIRONCIN COMMENTS
as per patient not instructed to use soaps or wipes at home by md as per patient not instructed to use Hibiclens  or wipes at home by md

## 2024-04-22 NOTE — ASU PATIENT PROFILE, ADULT - NSICDXPASTMEDICALHX_GEN_ALL_CORE_FT
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

## 2024-04-22 NOTE — H&P ADULT - NSICDXPASTMEDICALHX_GEN_ALL_CORE_FT
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

## 2024-04-22 NOTE — H&P ADULT - NSICDXPASTSURGICALHX_GEN_ALL_CORE_FT
PAST SURGICAL HISTORY:  Previous back surgery x2    S/P knee replacement right    S/P total left hip arthroplasty 2019

## 2024-04-22 NOTE — H&P ADULT - HISTORY OF PRESENT ILLNESS
80 y/o Female c/o right hip pain x    Presents today for elective robotic right total hip replacement  80 y/o Female c/o right hip pain x years worsening since december. She reports that the pain began spontaneously and without accident or trauma. Pt pain has progressively worsened without improvement and has become increasingly unresponsive to conservative management. Pt ambulates with a cane for assistance at home. She is only able to ambulate 1 block before being limited by pain. Denies numbness/tingling of extremities.    Denies any history of blood clots or seizures. History of a stoke after covid vaccination and take a Aspirin daily.  Denies F/C/N/V, CP, SOB today.     Presents today for elective robotic right total hip replacement.

## 2024-04-22 NOTE — H&P ADULT - NSHPPHYSICALEXAM_GEN_ALL_CORE
Gen: ***, NAD  MSK: Decreased right hip ROM secondary to pain      Rest of PE per MD clearance Gen: 79F NAD  MSK: Decreased right hip ROM secondary to pain  Skin without erythema, ecchymosis, abrasions or lesions  Calves soft, nontender bilaterally   Sensation intact to light touch bilateral lower extremities  Pulses: +2DP, brisk capillary refill  RLE EHL/FHL/TA/GS 5/5, LLE EHL 0/5, FHL/TA/GS 5/5    Rest of PE per MD clearance

## 2024-04-23 ENCOUNTER — RESULT REVIEW (OUTPATIENT)
Age: 79
End: 2024-04-23

## 2024-04-23 ENCOUNTER — INPATIENT (INPATIENT)
Facility: HOSPITAL | Age: 79
LOS: 1 days | Discharge: HOME HEALTH | End: 2024-04-25
Attending: SPECIALIST | Admitting: SPECIALIST
Payer: MEDICARE

## 2024-04-23 ENCOUNTER — APPOINTMENT (OUTPATIENT)
Dept: ORTHOPEDIC SURGERY | Facility: HOSPITAL | Age: 79
End: 2024-04-23

## 2024-04-23 DIAGNOSIS — Z98.890 OTHER SPECIFIED POSTPROCEDURAL STATES: Chronic | ICD-10-CM

## 2024-04-23 DIAGNOSIS — M19.90 UNSPECIFIED OSTEOARTHRITIS, UNSPECIFIED SITE: ICD-10-CM

## 2024-04-23 DIAGNOSIS — Z86.59 PERSONAL HISTORY OF OTHER MENTAL AND BEHAVIORAL DISORDERS: ICD-10-CM

## 2024-04-23 DIAGNOSIS — I63.9 CEREBRAL INFARCTION, UNSPECIFIED: ICD-10-CM

## 2024-04-23 DIAGNOSIS — Z96.659 PRESENCE OF UNSPECIFIED ARTIFICIAL KNEE JOINT: Chronic | ICD-10-CM

## 2024-04-23 DIAGNOSIS — M48.00 SPINAL STENOSIS, SITE UNSPECIFIED: ICD-10-CM

## 2024-04-23 DIAGNOSIS — E78.5 HYPERLIPIDEMIA, UNSPECIFIED: ICD-10-CM

## 2024-04-23 DIAGNOSIS — I10 ESSENTIAL (PRIMARY) HYPERTENSION: ICD-10-CM

## 2024-04-23 DIAGNOSIS — Z96.642 PRESENCE OF LEFT ARTIFICIAL HIP JOINT: Chronic | ICD-10-CM

## 2024-04-23 PROCEDURE — 27130 TOTAL HIP ARTHROPLASTY: CPT | Mod: RT

## 2024-04-23 PROCEDURE — 72170 X-RAY EXAM OF PELVIS: CPT | Mod: 26

## 2024-04-23 PROCEDURE — S2900 ROBOTIC SURGICAL SYSTEM: CPT | Mod: NC

## 2024-04-23 DEVICE — FEM PROSTH MOD INSIGNIA COLLARED HIGH SZ 4 36X103MM: Type: IMPLANTABLE DEVICE | Status: FUNCTIONAL

## 2024-04-23 DEVICE — HEAD DELTA CER V40 32 PLUS 4: Type: IMPLANTABLE DEVICE | Status: FUNCTIONAL

## 2024-04-23 DEVICE — SCREW HEX LO PROF 6.5X30MM: Type: IMPLANTABLE DEVICE | Status: FUNCTIONAL

## 2024-04-23 DEVICE — SCREW HEX LO PROF 6.5X25MM: Type: IMPLANTABLE DEVICE | Status: FUNCTIONAL

## 2024-04-23 DEVICE — IMPLANTABLE DEVICE: Type: IMPLANTABLE DEVICE | Status: FUNCTIONAL

## 2024-04-23 DEVICE — SHELL ACET TRIDENT II D 48MM: Type: IMPLANTABLE DEVICE | Status: FUNCTIONAL

## 2024-04-23 DEVICE — LINER MDM CEMENTLESS: Type: IMPLANTABLE DEVICE | Status: FUNCTIONAL

## 2024-04-23 DEVICE — LINER ACET TRIDENT X3 10 DEG 32MM D: Type: IMPLANTABLE DEVICE | Status: FUNCTIONAL

## 2024-04-23 RX ORDER — CELECOXIB 200 MG/1
200 CAPSULE ORAL ONCE
Refills: 0 | Status: COMPLETED | OUTPATIENT
Start: 2024-04-23 | End: 2024-04-23

## 2024-04-23 RX ORDER — AMLODIPINE BESYLATE 2.5 MG/1
10 TABLET ORAL DAILY
Refills: 0 | Status: DISCONTINUED | OUTPATIENT
Start: 2024-04-23 | End: 2024-04-23

## 2024-04-23 RX ORDER — ATORVASTATIN CALCIUM 80 MG/1
40 TABLET, FILM COATED ORAL AT BEDTIME
Refills: 0 | Status: DISCONTINUED | OUTPATIENT
Start: 2024-04-23 | End: 2024-04-23

## 2024-04-23 RX ORDER — ESCITALOPRAM OXALATE 10 MG/1
15 TABLET, FILM COATED ORAL AT BEDTIME
Refills: 0 | Status: DISCONTINUED | OUTPATIENT
Start: 2024-04-23 | End: 2024-04-25

## 2024-04-23 RX ORDER — OMEPRAZOLE 10 MG/1
1 CAPSULE, DELAYED RELEASE ORAL
Refills: 0 | DISCHARGE

## 2024-04-23 RX ORDER — AMLODIPINE BESYLATE 2.5 MG/1
10 TABLET ORAL DAILY
Refills: 0 | Status: DISCONTINUED | OUTPATIENT
Start: 2024-04-23 | End: 2024-04-25

## 2024-04-23 RX ORDER — ATORVASTATIN CALCIUM 80 MG/1
40 TABLET, FILM COATED ORAL AT BEDTIME
Refills: 0 | Status: DISCONTINUED | OUTPATIENT
Start: 2024-04-23 | End: 2024-04-25

## 2024-04-23 RX ORDER — HYDROMORPHONE HYDROCHLORIDE 2 MG/ML
0.5 INJECTION INTRAMUSCULAR; INTRAVENOUS; SUBCUTANEOUS ONCE
Refills: 0 | Status: DISCONTINUED | OUTPATIENT
Start: 2024-04-23 | End: 2024-04-25

## 2024-04-23 RX ORDER — ATORVASTATIN CALCIUM 80 MG/1
1 TABLET, FILM COATED ORAL
Refills: 0 | DISCHARGE

## 2024-04-23 RX ORDER — CEFAZOLIN SODIUM 1 G
2000 VIAL (EA) INJECTION EVERY 8 HOURS
Refills: 0 | Status: COMPLETED | OUTPATIENT
Start: 2024-04-23 | End: 2024-04-24

## 2024-04-23 RX ORDER — APREPITANT 80 MG/1
40 CAPSULE ORAL ONCE
Refills: 0 | Status: COMPLETED | OUTPATIENT
Start: 2024-04-23 | End: 2024-04-23

## 2024-04-23 RX ORDER — METOPROLOL TARTRATE 50 MG
50 TABLET ORAL
Refills: 0 | Status: DISCONTINUED | OUTPATIENT
Start: 2024-04-23 | End: 2024-04-25

## 2024-04-23 RX ORDER — ACETAMINOPHEN 500 MG
1000 TABLET ORAL ONCE
Refills: 0 | Status: COMPLETED | OUTPATIENT
Start: 2024-04-23 | End: 2024-04-23

## 2024-04-23 RX ADMIN — CELECOXIB 100 MILLIGRAM(S): 200 CAPSULE ORAL at 18:31

## 2024-04-23 RX ADMIN — Medication 100 MILLIGRAM(S): at 19:10

## 2024-04-23 RX ADMIN — Medication 1 APPLICATION(S): at 09:25

## 2024-04-23 RX ADMIN — POLYETHYLENE GLYCOL 3350 17 GRAM(S): 17 POWDER, FOR SOLUTION ORAL at 22:40

## 2024-04-23 RX ADMIN — Medication 50 MILLIGRAM(S): at 18:31

## 2024-04-23 RX ADMIN — Medication 1000 MILLIGRAM(S): at 22:40

## 2024-04-23 RX ADMIN — SENNA PLUS 2 TABLET(S): 8.6 TABLET ORAL at 22:40

## 2024-04-23 RX ADMIN — ESCITALOPRAM OXALATE 15 MILLIGRAM(S): 10 TABLET, FILM COATED ORAL at 22:40

## 2024-04-23 RX ADMIN — CHLORHEXIDINE GLUCONATE 1 APPLICATION(S): 213 SOLUTION TOPICAL at 08:50

## 2024-04-23 RX ADMIN — CELECOXIB 200 MILLIGRAM(S): 200 CAPSULE ORAL at 09:20

## 2024-04-23 RX ADMIN — APREPITANT 40 MILLIGRAM(S): 80 CAPSULE ORAL at 09:19

## 2024-04-23 RX ADMIN — ATORVASTATIN CALCIUM 40 MILLIGRAM(S): 80 TABLET, FILM COATED ORAL at 22:40

## 2024-04-23 RX ADMIN — CELECOXIB 100 MILLIGRAM(S): 200 CAPSULE ORAL at 18:48

## 2024-04-23 RX ADMIN — Medication 1000 MILLIGRAM(S): at 09:19

## 2024-04-23 NOTE — PHYSICAL THERAPY INITIAL EVALUATION ADULT - AMBULATION SKILLS, REHAB EVAL
Pt called, states that he has been nauseous since yesterday, he feels very full and dizzy and is unable to eat. He has a history of gastroparesis, but he is not sure if this could be a side effect of the Trulicity he was recently started on. He is asking if he should go to the ER? Please advise.   
Pt notified.  
needs device

## 2024-04-23 NOTE — PROGRESS NOTE ADULT - SUBJECTIVE AND OBJECTIVE BOX
Ortho Post Op Check    Procedure: Right total hip replacement  Surgeon: Dr. Archer    Pt seen and examined at bedside on PACU. Pt comfortable without complaints, pain controlled  Denies CP, SOB, N/V, numbness/tingling, dizziness.     Vital Signs Last 24 Hrs  T(C): 36.3 (04-23-24 @ 16:04), Max: 36.3 (04-23-24 @ 16:04)  T(F): 97.4 (04-23-24 @ 16:04), Max: 97.4 (04-23-24 @ 16:04)  HR: 78 (04-23-24 @ 17:00) (68 - 90)  BP: 108/56 (04-23-24 @ 16:04) (96/60 - 108/56)  BP(mean): 77 (04-23-24 @ 16:04) (73 - 80)  RR: 23 (04-23-24 @ 17:00) (10 - 23)  SpO2: 85% (04-23-24 @ 17:00) (85% - 96%)  I&O's Summary      General: Pt Alert and oriented, NAD  DSG C/D/I- Prineo right hip  Pulses: 2+ DP bilaterally, brisk cap refill  Sensation: SILT distally bilateral lower extremities  Motor: EHL/FHL/TA/GS: 5/5 bilaterally        A/P: 79yFemale POD#0 s/p right total hip replacement  - Stable  - Pain Control  - DVT ppx: SCDs, aspirin 81mg oral twice a day  - Post op abx: Ancef  - PT, WBS: WBAT with no posterior hip precautions, no abduction pillow  - Dispo: pending PT eval    Ortho Pager [phone] Ortho Post Op Check    Procedure: Right total hip replacement  Surgeon: Dr. Archer    Pt seen and examined at bedside on PACU. Pt comfortable without complaints, pain controlled  Denies CP, SOB, N/V, numbness/tingling, dizziness.     Vital Signs Last 24 Hrs  T(C): 36.3 (04-23-24 @ 16:04), Max: 36.3 (04-23-24 @ 16:04)  T(F): 97.4 (04-23-24 @ 16:04), Max: 97.4 (04-23-24 @ 16:04)  HR: 78 (04-23-24 @ 17:00) (68 - 90)  BP: 108/56 (04-23-24 @ 16:04) (96/60 - 108/56)  BP(mean): 77 (04-23-24 @ 16:04) (73 - 80)  RR: 23 (04-23-24 @ 17:00) (10 - 23)  SpO2: 85% (04-23-24 @ 17:00) (85% - 96%)  I&O's Summary      General: Pt Alert and oriented, NAD  DSG C/D/I- Prineo right hip  Pulses: 2+ DP bilaterally, brisk cap refill  Sensation: SILT distally bilateral lower extremities  Motor: EHL/FHL/TA/GS: 5/5 RLE Left EHL: 0/5, Left FHL/TA/GS: 5/5        A/P: 79yFemale POD#0 s/p right total hip replacement  - Stable  - Pain Control  - DVT ppx: SCDs, aspirin 81mg oral twice a day  - Post op abx: Ancef  - PT, WBS: WBAT with no posterior hip precautions, no abduction pillow  - Dispo: pending PT eval    Ortho Pager [phone]

## 2024-04-23 NOTE — PHYSICAL THERAPY INITIAL EVALUATION ADULT - PERTINENT HX OF CURRENT PROBLEM, REHAB EVAL
Patient is a 79 year old female c/o right hip pain x years worsening since december. She reports that the pain began spontaneously and without accident or trauma. Pt pain has progressively worsened without improvement and has become increasingly unresponsive to conservative management. Pt ambulates with a cane for assistance at home. She is only able to ambulate 1 block before being limited by pain.

## 2024-04-23 NOTE — PHYSICAL THERAPY INITIAL EVALUATION ADULT - DID THE PATIENT HAVE SURGERY?
Total replacement of right hip using KODI/yes Total replacement of right hip using KODI (posterior)/yes

## 2024-04-23 NOTE — PRE-ANESTHESIA EVALUATION ADULT - NSANTHADDINFOFT_GEN_ALL_CORE
GA  - given h/o multiple spine surgery, will do GA  R/B/A discussed with patient including risks of MI,CVA, and thrombosis.  All questions answered.

## 2024-04-23 NOTE — PHYSICAL THERAPY INITIAL EVALUATION ADULT - ADDITIONAL COMMENTS
Patient reports she lives in elevator apartment with 4 RACHAEL. PTA patient ambulated with SC. Has tub shower with shower chair and grab bars. Owns commode

## 2024-04-23 NOTE — PHYSICAL THERAPY INITIAL EVALUATION ADULT - GENERAL OBSERVATIONS, REHAB EVAL
ELIZABETH Mejia aware of intent to treat. Patient received semi-supine in NAD with +heplock +R hip dressing clean/dry/intact +SCD x 2

## 2024-04-24 ENCOUNTER — TRANSCRIPTION ENCOUNTER (OUTPATIENT)
Age: 79
End: 2024-04-24

## 2024-04-24 LAB
ANION GAP SERPL CALC-SCNC: 7 MMOL/L — SIGNIFICANT CHANGE UP (ref 5–17)
BUN SERPL-MCNC: 21 MG/DL — SIGNIFICANT CHANGE UP (ref 7–23)
CALCIUM SERPL-MCNC: 9 MG/DL — SIGNIFICANT CHANGE UP (ref 8.4–10.5)
CHLORIDE SERPL-SCNC: 106 MMOL/L — SIGNIFICANT CHANGE UP (ref 96–108)
CO2 SERPL-SCNC: 27 MMOL/L — SIGNIFICANT CHANGE UP (ref 22–31)
CREAT SERPL-MCNC: 0.56 MG/DL — SIGNIFICANT CHANGE UP (ref 0.5–1.3)
EGFR: 94 ML/MIN/1.73M2 — SIGNIFICANT CHANGE UP
GLUCOSE SERPL-MCNC: 117 MG/DL — HIGH (ref 70–99)
HCT VFR BLD CALC: 25.9 % — LOW (ref 34.5–45)
HGB BLD-MCNC: 8.6 G/DL — LOW (ref 11.5–15.5)
MCHC RBC-ENTMCNC: 31 PG — SIGNIFICANT CHANGE UP (ref 27–34)
MCHC RBC-ENTMCNC: 33.2 GM/DL — SIGNIFICANT CHANGE UP (ref 32–36)
MCV RBC AUTO: 93.5 FL — SIGNIFICANT CHANGE UP (ref 80–100)
NRBC # BLD: 0 /100 WBCS — SIGNIFICANT CHANGE UP (ref 0–0)
PLATELET # BLD AUTO: 167 K/UL — SIGNIFICANT CHANGE UP (ref 150–400)
POTASSIUM SERPL-MCNC: 4.6 MMOL/L — SIGNIFICANT CHANGE UP (ref 3.5–5.3)
POTASSIUM SERPL-SCNC: 4.6 MMOL/L — SIGNIFICANT CHANGE UP (ref 3.5–5.3)
RBC # BLD: 2.77 M/UL — LOW (ref 3.8–5.2)
RBC # FLD: 12.4 % — SIGNIFICANT CHANGE UP (ref 10.3–14.5)
SODIUM SERPL-SCNC: 140 MMOL/L — SIGNIFICANT CHANGE UP (ref 135–145)
WBC # BLD: 9.72 K/UL — SIGNIFICANT CHANGE UP (ref 3.8–10.5)
WBC # FLD AUTO: 9.72 K/UL — SIGNIFICANT CHANGE UP (ref 3.8–10.5)

## 2024-04-24 PROCEDURE — 99223 1ST HOSP IP/OBS HIGH 75: CPT

## 2024-04-24 PROCEDURE — 70450 CT HEAD/BRAIN W/O DYE: CPT | Mod: 26

## 2024-04-24 RX ORDER — ESCITALOPRAM OXALATE 10 MG/1
1 TABLET, FILM COATED ORAL
Refills: 0 | DISCHARGE

## 2024-04-24 RX ORDER — METOPROLOL TARTRATE 50 MG
1 TABLET ORAL
Refills: 0 | DISCHARGE

## 2024-04-24 RX ORDER — ACETAMINOPHEN 500 MG
2 TABLET ORAL
Qty: 0 | Refills: 0 | DISCHARGE
Start: 2024-04-24

## 2024-04-24 RX ORDER — ESCITALOPRAM OXALATE 10 MG/1
2 TABLET, FILM COATED ORAL
Refills: 0 | DISCHARGE

## 2024-04-24 RX ORDER — ESCITALOPRAM OXALATE 10 MG/1
3 TABLET, FILM COATED ORAL
Qty: 0 | Refills: 0 | DISCHARGE
Start: 2024-04-24

## 2024-04-24 RX ORDER — ASPIRIN/CALCIUM CARB/MAGNESIUM 324 MG
1 TABLET ORAL
Qty: 0 | Refills: 0 | DISCHARGE
Start: 2024-04-24

## 2024-04-24 RX ORDER — CELECOXIB 200 MG/1
1 CAPSULE ORAL
Qty: 0 | Refills: 0 | DISCHARGE
Start: 2024-04-24

## 2024-04-24 RX ORDER — AMLODIPINE BESYLATE 2.5 MG/1
1 TABLET ORAL
Qty: 0 | Refills: 0 | DISCHARGE
Start: 2024-04-24

## 2024-04-24 RX ORDER — SENNA PLUS 8.6 MG/1
2 TABLET ORAL
Qty: 0 | Refills: 0 | DISCHARGE
Start: 2024-04-24

## 2024-04-24 RX ORDER — METOPROLOL TARTRATE 50 MG
1 TABLET ORAL
Qty: 0 | Refills: 0 | DISCHARGE
Start: 2024-04-24

## 2024-04-24 RX ORDER — ASPIRIN/CALCIUM CARB/MAGNESIUM 324 MG
1 TABLET ORAL
Refills: 0 | DISCHARGE

## 2024-04-24 RX ORDER — AMLODIPINE BESYLATE 2.5 MG/1
1 TABLET ORAL
Refills: 0 | DISCHARGE

## 2024-04-24 RX ADMIN — SENNA PLUS 2 TABLET(S): 8.6 TABLET ORAL at 21:22

## 2024-04-24 RX ADMIN — OXYCODONE HYDROCHLORIDE 5 MILLIGRAM(S): 5 TABLET ORAL at 10:29

## 2024-04-24 RX ADMIN — CELECOXIB 100 MILLIGRAM(S): 200 CAPSULE ORAL at 05:36

## 2024-04-24 RX ADMIN — ESCITALOPRAM OXALATE 15 MILLIGRAM(S): 10 TABLET, FILM COATED ORAL at 21:22

## 2024-04-24 RX ADMIN — Medication 1000 MILLIGRAM(S): at 14:53

## 2024-04-24 RX ADMIN — POLYETHYLENE GLYCOL 3350 17 GRAM(S): 17 POWDER, FOR SOLUTION ORAL at 21:22

## 2024-04-24 RX ADMIN — Medication 1000 MILLIGRAM(S): at 21:23

## 2024-04-24 RX ADMIN — Medication 81 MILLIGRAM(S): at 18:40

## 2024-04-24 RX ADMIN — Medication 81 MILLIGRAM(S): at 05:36

## 2024-04-24 RX ADMIN — ATORVASTATIN CALCIUM 40 MILLIGRAM(S): 80 TABLET, FILM COATED ORAL at 21:22

## 2024-04-24 RX ADMIN — Medication 100 MILLIGRAM(S): at 04:35

## 2024-04-24 RX ADMIN — Medication 1000 MILLIGRAM(S): at 05:36

## 2024-04-24 RX ADMIN — OXYCODONE HYDROCHLORIDE 5 MILLIGRAM(S): 5 TABLET ORAL at 09:29

## 2024-04-24 RX ADMIN — Medication 1 TABLET(S): at 12:15

## 2024-04-24 RX ADMIN — CELECOXIB 100 MILLIGRAM(S): 200 CAPSULE ORAL at 18:40

## 2024-04-24 RX ADMIN — Medication 50 MILLIGRAM(S): at 18:40

## 2024-04-24 RX ADMIN — FAMOTIDINE 20 MILLIGRAM(S): 10 INJECTION INTRAVENOUS at 12:15

## 2024-04-24 RX ADMIN — Medication 1000 MILLIGRAM(S): at 22:23

## 2024-04-24 NOTE — CONSULT NOTE ADULT - SUBJECTIVE AND OBJECTIVE BOX
Patient cleared by outpatient provider for surgery.  See below for orthopedic HPI  "78 y/o Female c/o right hip pain x years worsening since december. She reports that the pain began spontaneously and without accident or trauma. Pt pain has progressively worsened without improvement and has become increasingly unresponsive to conservative management. Pt ambulates with a cane for assistance at home. She is only able to ambulate 1 block before being limited by pain. Denies numbness/tingling of extremities.    Denies any history of blood clots or seizures. History of a stoke after covid vaccination and take a Aspirin daily.  Denies F/C/N/V, CP, SOB today.     Presents today for elective robotic right total hip replacement.       Review of Systems:  Review of Systems: musculoskeletal: + right hip pain  Other Review of Systems: All other review of systems negative, except as noted in HPI      Allergies and Intolerances:        Allergies:  	Demerol: Drug, Nausea    Home Medications:   * Patient Currently Takes Medications as of 23-Dec-2023 17:06 documented in Structured Notes  · 	Physical Therapy: Home Physical Therapy  · 	ondansetron 4 mg oral tablet: 1 tab(s) orally every 6 hours as needed for  nausea  · 	aspirin 81 mg oral tablet, chewable: 1 tab(s) chewed 2 times a day  · 	acetaminophen 650 mg oral tablet, extended release: 2 tab(s) orally every 6 hours  · 	cyclobenzaprine 10 mg oral tablet: 1 tab(s) orally every 8 hours as needed for muscle spasm  · 	Colace 100 mg oral capsule: 1 cap(s) orally 3 times a day as needed for  constipation  · 	CeleBREX 200 mg oral capsule: 1 cap(s) orally once a day  · 	metoprolol tartrate 50 mg oral tablet: 1 tab(s) orally 2 times a day  · 	metoprolol succinate 100 mg oral tablet, extended release: 1 tab(s) orally once a day  · 	aspirin 81 mg oral tablet: orally once a day  · 	Norvasc 2.5 mg oral tablet: 1 tab(s) orally once a day  · 	atorvastatin 40 mg oral tablet: 1 tab(s) orally once a day  · 	Lexapro 10 mg oral tablet: Last Dose Taken: 22-Apr-2024 PM, 1 tab(s) orally once a day  · 	metoprolol succinate 50 mg oral capsule, extended release: Last Dose Taken: 23-Apr-2024 AM, 1 cap(s) orally 2 times a day  · 	aspirin 81 mg oral capsule: Last Dose Taken: 22-Apr-2024 PM, 1 cap(s) orally  · 	omeprazole 40 mg oral delayed release capsule: Last Dose Taken: 22-Apr-2024 AM, 1 cap(s) orally once a day  · 	Norvasc 10 mg oral tablet: Last Dose Taken: 23-Apr-2024 AM, 1 tab(s) orally once a day  · 	atorvastatin 40 mg oral tablet: Last Dose Taken: 22-Apr-2024 PM, 1 tab(s) orally once a day  · 	Lexapro 5 mg oral tablet: Last Dose Taken: 22-Apr-2024 PM, 2 tab(s) orally once a day    Patient History:   Past Medical, Past Surgical, and Family History:  PAST MEDICAL HISTORY:  H/O: depression     HLD (hyperlipidemia)     HTN (hypertension)     OA (osteoarthritis)     Spinal stenosis     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     PAST SURGICAL HISTORY:  Previous back surgery x2    S/P knee replacement right    S/P total left hip arthroplasty 2019.    Social History:  · Substance use	No    Tobacco Screening:  · Core Measure Site	No  "    Vital Signs Last 24 Hrs  T(C): 37.1 (24 Apr 2024 09:28), Max: 37.1 (24 Apr 2024 09:28)  T(F): 98.7 (24 Apr 2024 09:28), Max: 98.7 (24 Apr 2024 09:28)  HR: 79 (24 Apr 2024 11:25) (73 - 87)  BP: 115/60 (24 Apr 2024 11:25) (96/60 - 124/67)  BP(mean): --  RR: 16 (24 Apr 2024 09:28) (16 - 23)  SpO2: 95% (24 Apr 2024 11:25) (85% - 98%)    Parameters below as of 24 Apr 2024 11:25  Patient On (Oxygen Delivery Method): room air    Physical exam  General: nad, sitting up in bed  heent: ncat, mmm  cards: rrr, normal s1s2, systolic murmur present (patient aware of this and had it for most of her life)  pulm: ctab, no wheeze, crackles, rales or rhonchi  ab: soft, ntnd, normoactive bowel sounds  ext: wwp, no asymmetry or edema  skin: warm and dry, no obvious rashes or lesions present  neuro: has chronic L foot drop, no other acute deficits noted

## 2024-04-24 NOTE — DISCHARGE NOTE PROVIDER - HOSPITAL COURSE
Admitted  Surgery RIGHT THR   Mariela-op Antibiotics  Pain control  DVT prophylaxis  OOB/Physical Therapy Admitted  Surgery RIGHT THR   Mariela-op Antibiotics  Pain control  DVT prophylaxis  OOB/Physical Therapy   Medical comanagement  Inpatient Events:   4/24: Head CT ordered for reported fall on 4/23 - negative for intracranial injury

## 2024-04-24 NOTE — DISCHARGE NOTE NURSING/CASE MANAGEMENT/SOCIAL WORK - NSDCPEFALRISK_GEN_ALL_CORE
For information on Fall & Injury Prevention, visit: [email or web address] OR  [email or web address] OR  https://www.cdc.gov/steadi/patient.html

## 2024-04-24 NOTE — DISCHARGE NOTE NURSING/CASE MANAGEMENT/SOCIAL WORK - PATIENT PORTAL LINK FT
You can access the FollowMyHealth Patient Portal offered by [facility] by registering at the following website: [email or web address]. By joining [employer]’s FollowMyHealth portal, you will also be able to view your health information using other applications (apps) compatible with our system.

## 2024-04-24 NOTE — CONSULT NOTE ADULT - ASSESSMENT
79F with PMH of HTN, HLD, OA, spinal stenosis and prior hip replacement presenting for elective R total hip replacement.  Now POD1.      #Post-operatative state  #Acute blood loss anemia, post-operative  - cleared for surgery by outpatient provider  - Hb ~8, continue to trend.  Likely all in setting of recent surgery.  No hemodynamic instability to suggest active bleeding  - maintain type and screen and transfuse for Hb <7  - pain management, DVT ppx and rest per primary team    #HTN  #HLD  - continue home dose of atorvastatin 40mg at bedtime, metoprolol succinate 50 BID and amlodipine 10mg daily    #Depression  - continue home dose of lexapro 10mg daily    #Fall with headstrike  -suffered a fall with headstrike day prior to surgery.  it was a mechanical fall as she was stretching out her had to reach for a door knob walking up the stairs and lost her balance.  No LOC.  Head CT negative for acute acute findings  - does have history of stroke after COVID.  Currently has a loop recorder in place which is due to come out this year.  Denies any chest pain, palpitations or other cardiopulmonary symptoms    high level of medical decision making required for this case, including personal review of head CT, discussion of plan with the orthopedic team, review of labs, trending of labs, recent Hip surgery due to severe progression of chronic pain resulting in significant difficulty walking.

## 2024-04-24 NOTE — DISCHARGE NOTE PROVIDER - NSDCFUADDINST_GEN_ALL_CORE_FT
See Dr. Archer discharge instructions sheet.  See Dr. Archer discharge instructions sheet.   ACTIVITY:   You may bear full weight as tolerated using a walker, crutches or cane as needed. You may use the walking aid which you were discharged with and switch to a cane whenever you feel comfortable doing so. You should use an assistive device until you can walk comfortably without it. Keep in mind that every patient moves at their own speed of recovery so take your time.    Apply ice packs to the surgical site and elevate the leg above the level of your heart when you’re at rest to reduce pain and swelling. For icing, twenty-minute sessions followed by an  hour off is recommended. You should ice as frequently as possible. Ice should NEVER be placed directly on the skin.    DRESSING/SHOWERING:   Please do not shower for the first 4 days after surgery. You have a waterproof mesh strip over the  main incision. You have an additional dressing near your stomach fold if your hip  replacement was done with robotic assistance. Spots of blood may be visible through the dressing and should not alarm you but call the office if the dressing becomes saturated with  blood. Do not remove the mesh strip over the main incision until it peels off very easily in 2-3 weeks or until your first post-op visit. Remove the smaller dressing 4 days after surgery. You can do this yourself or the visiting nurse will assist. If the incision is dry and there is no drainage, you may shower with the dressing off 4 days after surgery. Do not scrub the incision. Pat dry. There is no need to cover the wound with a new dressing if there is no drainage. You should not immerse the wound in water (bath, swimming, whirlpool) for 3-4 weeks after surgery, or until the wound is completely healed with no scabs or crusts. Do not apply any creams or ointments to your incision. If you observe drainage from the incision after removing the dressing, please call the office. There are no stitches or staples to be removed.    MEDICATION/ANTICOAGULATION:   - Your medications were sent to your pharmacy. Please take as prescribed.  - Nonsteroidal anti-inflammatory (NSAID) medication is prescribed to reduce pain and inflammation, unless there is a contraindication. You've been prescribed Celebrex 100mg or  Celebrex 200mg twice a day OR Meloxicam 7.5mg or 15mg daily. Take as prescribed, even if your pain is mild. Do not combine Meloxicam or Celebrex with over-the-counter NSAIDs (such as Advil or Aleve) or any other prescribed NSAIDs. If you experience stomach pain or discoloration of your stool, stop the medication, and call your doctor. Acetaminophen (Tylenol) is prescribed to treat mild to moderate pain and to minimize the amount of narcotic medication required to manage severe pain. You’ve been prescribed Acetaminophen 1,000mg every 6-8 hours for the first few weeks, even when pain is mild, as this will reduce how often you feel the need to take narcotics. This medication is very safe at prescribed doses. Do not exceed 4,000mg of Acetaminophen in a 24-hour period. Narcotic (opiate)?pain medication(s) are prescribed to treat severe pain.? You've been prescribed Tramadol 50mg to take every 6 hours as needed or Oxycodone 5mg to take every 6 hours as needed.??Take these medications only if you are experiencing pain and wean off them rapidly if possible as they can be habit-forming.? Call the office if your pain is not well controlled.? If you have severe pain and are running low on medication, you may request a refill.??Refills can only be handled during regular business hours.?Remember to contact the office by 4:00 on Friday if you believe you will need medications over the weekend.?Side effects of opioids include nausea, respiratory depression, sedation, and constipation. Take a stool softener (Miralax/Senna) if you experience constipation and anti-nausea medication (Zofran/Ondansetron) if you experience nausea. Driving, operating heavy machinery and drinking alcohol are prohibited while taking these medications. Blood thinners are prescribed to reduce the risk of blood clots forming after surgery. You've been prescribed Aspirin (Ecotrin) 81mg twice a day. Please take the full course as prescribed. If you’ve been prescribed another blood thinner (Eliquis or Xarelto) due to your medical history, please take as directed. Antacids protect your stomach from acid irritation. You’ve been prescribed Pepcid (Famotidine) 20mg once daily to help your stomach tolerate the combination of aspirin with an NSAID. Please take the full course as prescribed. You should restart all of your prescription medications once home unless specifically instructed otherwise.     FOLLOW-UP:   - Call to schedule an appt with Dr. Archer for follow up.   - Please follow-up with your primary care physician or any other specialist you see postoperatively, if needed.    - Contact your doctor or go to the emergency room if you experience: fever greater than 101.5, chills, chest pain, difficulty breathing, redness or excessive drainage around the incision, other concerns.

## 2024-04-24 NOTE — PROGRESS NOTE ADULT - SUBJECTIVE AND OBJECTIVE BOX
POST OPERATIVE DAY #:   STATUS POST: Left    Right  TKA/THR                        SUBJECTIVE: Patient seen and examined.   Denies any sob/cp/n/v/numbness or tingling in b/l     OBJECTIVE:     Vital Signs Last 24 Hrs  T(C): 37.1 (24 Apr 2024 09:28), Max: 37.1 (24 Apr 2024 09:28)  T(F): 98.7 (24 Apr 2024 09:28), Max: 98.7 (24 Apr 2024 09:28)  HR: 85 (24 Apr 2024 09:28) (68 - 90)  BP: 124/67 (24 Apr 2024 09:28) (96/60 - 124/67)  BP(mean): 77 (23 Apr 2024 16:04) (73 - 80)  RR: 16 (24 Apr 2024 09:28) (10 - 23)  SpO2: 95% (24 Apr 2024 09:28) (85% - 98%)    Parameters below as of 24 Apr 2024 09:28  Patient On (Oxygen Delivery Method): room air        General:  Affected extremity:   Dressing: clean/dry/intact   Sensation: intact to light touch to patient's baseline  Motor exam: EHL/TA/GS    Pulses             I&O's Detail      LABS:                        8.6    9.72  )-----------( 167      ( 24 Apr 2024 05:30 )             25.9     04-24    140  |  106  |  21  ----------------------------<  117<H>  4.6   |  27  |  0.56    Ca    9.0      24 Apr 2024 05:30        Urinalysis Basic - ( 24 Apr 2024 05:30 )    Color: x / Appearance: x / SG: x / pH: x  Gluc: 117 mg/dL / Ketone: x  / Bili: x / Urobili: x   Blood: x / Protein: x / Nitrite: x   Leuk Esterase: x / RBC: x / WBC x   Sq Epi: x / Non Sq Epi: x / Bacteria: x        MEDICATIONS:    acetaminophen     Tablet .. 1000 milliGRAM(s) Oral every 8 hours  celecoxib 100 milliGRAM(s) Oral every 12 hours  escitalopram 15 milliGRAM(s) Oral at bedtime  HYDROmorphone  Injectable 0.5 milliGRAM(s) IV Push once  ondansetron Injectable 4 milliGRAM(s) IV Push every 6 hours PRN  oxyCODONE    IR 5 milliGRAM(s) Oral every 6 hours PRN    aspirin enteric coated 81 milliGRAM(s) Oral two times a day        ASSESSMENT AND PLAN: 78yo Female s/p     1. Analgesic pain control  2. DVT prophylaxis: ASA      HSQ       Coumadin      Lovenox      SCDs      Other:   3. Weight Bearing Status:  Weight bearing as tolerated       NWB         Partial Weight Bearing  4. Disposition: Home          Subacute Rehab      pending PT evaluation POST OPERATIVE DAY #: 1  STATUS POST: Right THR                        SUBJECTIVE: Patient seen and examined. Patient has been experiencing mild right hip soreness following the procedure. She denies radiation of the pain. Patient experienced an episode of dizziness 4/23 per PT resulting in her to fall and hit her head. She denies any further episodes of dizziness following the fall. Patient complains of a mild headache that began earlier this morning. She denies chest pain, SOB, or lightheadedness. Denies any difficulty urinating or with bowel movements. Denies any sob/cp/n/v/numbness or tingling in b/l. She is seeking rehab upon discharge because she is worried about falling at home. She lives alone and is responsible for doing everything herself. Has a history of previous joint procedures without an issue during recovery. Patient has a loop recorder present from 3 years ago.       OBJECTIVE: Patient appeared comfortable with hip pain well-controlled.     Vital Signs Last 24 Hrs  T(C): 37.1 (24 Apr 2024 09:28), Max: 37.1 (24 Apr 2024 09:28)  T(F): 98.7 (24 Apr 2024 09:28), Max: 98.7 (24 Apr 2024 09:28)  HR: 85 (24 Apr 2024 09:28) (68 - 90)  BP: 124/67 (24 Apr 2024 09:28) (96/60 - 124/67)  BP(mean): 77 (23 Apr 2024 16:04) (73 - 80)  RR: 16 (24 Apr 2024 09:28) (10 - 23)  SpO2: 95% (24 Apr 2024 09:28) (85% - 98%)    Parameters below as of 24 Apr 2024 09:28  Patient On (Oxygen Delivery Method): room air        General: NAD and well-appearing  Affected extremity: non-erythematous and no ecchymosis  Dressing: clean/dry/intact   Sensation: intact to light touch to patient's baseline  Motor exam: EHL/TA/GS+ with 5/5 muscle strength on right-side. 4/5 strength on the left-side, this is her baseline due to a history of left-foot weakness prior to this surgery.   Pulses 2+ b/l              I&O's Detail      LABS:                        8.6    9.72  )-----------( 167      ( 24 Apr 2024 05:30 )             25.9     04-24    140  |  106  |  21  ----------------------------<  117<H>  4.6   |  27  |  0.56    Ca    9.0      24 Apr 2024 05:30        Urinalysis Basic - ( 24 Apr 2024 05:30 )    Color: x / Appearance: x / SG: x / pH: x  Gluc: 117 mg/dL / Ketone: x  / Bili: x / Urobili: x   Blood: x / Protein: x / Nitrite: x   Leuk Esterase: x / RBC: x / WBC x   Sq Epi: x / Non Sq Epi: x / Bacteria: x        MEDICATIONS:    acetaminophen     Tablet .. 1000 milliGRAM(s) Oral every 8 hours  celecoxib 100 milliGRAM(s) Oral every 12 hours  escitalopram 15 milliGRAM(s) Oral at bedtime  HYDROmorphone  Injectable 0.5 milliGRAM(s) IV Push once  ondansetron Injectable 4 milliGRAM(s) IV Push every 6 hours PRN  oxyCODONE    IR 5 milliGRAM(s) Oral every 6 hours PRN    aspirin enteric coated 81 milliGRAM(s) Oral two times a day        ASSESSMENT AND PLAN: 78yo Female s/p right THR    1. Analgesic pain control  2. DVT prophylaxis: ASA    3. Weight Bearing Status: Weight bearing as tolerated  4. Disposition: Home  5. Patient fell and hit head 4/23: waiting for results from head CT  POST OPERATIVE DAY #: 1  STATUS POST: Right THR                        SUBJECTIVE: Patient seen and examined. Patient has been experiencing mild right hip soreness following the procedure. She denies radiation of the pain. Patient experienced an episode of dizziness 4/23 per PT resulting in her to fall and hit her head. She denies any further episodes of dizziness following the fall. Patient complains of a mild headache that began earlier this morning. Denies any difficulty urinating or with bowel movements. Denies any sob/cp/n/v/numbness or tingling in b/l. She is seeking rehab upon discharge because she is worried about falling at home. She lives alone and is responsible for doing everything herself. Patient has a loop recorder present from 3 years ago.       OBJECTIVE: Patient appeared comfortable with hip pain well-controlled.     Vital Signs Last 24 Hrs  T(C): 37.1 (24 Apr 2024 09:28), Max: 37.1 (24 Apr 2024 09:28)  T(F): 98.7 (24 Apr 2024 09:28), Max: 98.7 (24 Apr 2024 09:28)  HR: 85 (24 Apr 2024 09:28) (68 - 90)  BP: 124/67 (24 Apr 2024 09:28) (96/60 - 124/67)  BP(mean): 77 (23 Apr 2024 16:04) (73 - 80)  RR: 16 (24 Apr 2024 09:28) (10 - 23)  SpO2: 95% (24 Apr 2024 09:28) (85% - 98%)    Parameters below as of 24 Apr 2024 09:28  Patient On (Oxygen Delivery Method): room air        General: NAD and well-appearing  Affected extremity: non-erythematous and non-ecchymotic  Dressing: clean/dry/intact   Sensation: intact to light touch to patient's baseline  Motor exam: EHL/TA/GS+ with 5/5 muscle strength on right-side. 4/5 strength on the left-side, this is her baseline due to a history of left-foot weakness prior to this surgery.   Pulses 2+ b/l              I&O's Detail      LABS:                        8.6    9.72  )-----------( 167      ( 24 Apr 2024 05:30 )             25.9     04-24    140  |  106  |  21  ----------------------------<  117<H>  4.6   |  27  |  0.56    Ca    9.0      24 Apr 2024 05:30        Urinalysis Basic - ( 24 Apr 2024 05:30 )    Color: x / Appearance: x / SG: x / pH: x  Gluc: 117 mg/dL / Ketone: x  / Bili: x / Urobili: x   Blood: x / Protein: x / Nitrite: x   Leuk Esterase: x / RBC: x / WBC x   Sq Epi: x / Non Sq Epi: x / Bacteria: x        MEDICATIONS:    acetaminophen     Tablet .. 1000 milliGRAM(s) Oral every 8 hours  celecoxib 100 milliGRAM(s) Oral every 12 hours  escitalopram 15 milliGRAM(s) Oral at bedtime  HYDROmorphone  Injectable 0.5 milliGRAM(s) IV Push once  ondansetron Injectable 4 milliGRAM(s) IV Push every 6 hours PRN  oxyCODONE    IR 5 milliGRAM(s) Oral every 6 hours PRN    aspirin enteric coated 81 milliGRAM(s) Oral two times a day        ASSESSMENT AND PLAN: 80yo Female s/p right THR    1. Analgesic pain control  2. DVT prophylaxis: ASA    3. Weight Bearing Status: Weight bearing as tolerated  4. Disposition: Home  5. Patient fell and hit head 4/23: waiting for results from head CT  POST OPERATIVE DAY #: 1  STATUS POST: Right THR                        SUBJECTIVE: Patient seen and examined. Patient has been experiencing mild right hip soreness following the procedure. She denies radiation of the pain. Patient experienced an episode of dizziness 4/23 per PT resulting in her to fall and hit her head. She denies any further episodes of dizziness following the fall. Patient complains of a mild headache that began earlier this morning. Denies any difficulty urinating or with bowel movements. She is seeking rehab upon discharge because she is worried about falling at home. She lives alone and is responsible for doing everything herself. Patient has a loop recorder present from 3 years ago. Denies any sob/cp/n/v/numbness or tingling in b/l.       OBJECTIVE: Patient appeared comfortable with hip pain well-controlled.     Vital Signs Last 24 Hrs  T(C): 37.1 (24 Apr 2024 09:28), Max: 37.1 (24 Apr 2024 09:28)  T(F): 98.7 (24 Apr 2024 09:28), Max: 98.7 (24 Apr 2024 09:28)  HR: 85 (24 Apr 2024 09:28) (68 - 90)  BP: 124/67 (24 Apr 2024 09:28) (96/60 - 124/67)  BP(mean): 77 (23 Apr 2024 16:04) (73 - 80)  RR: 16 (24 Apr 2024 09:28) (10 - 23)  SpO2: 95% (24 Apr 2024 09:28) (85% - 98%)    Parameters below as of 24 Apr 2024 09:28  Patient On (Oxygen Delivery Method): room air        General: NAD and well-appearing  Affected extremity: rIGHT LE skin intact, non-erythematous and non-ecchymotic  Dressing: clean/dry/intact   Sensation: intact to light touch to patient's baseline  Motor exam: EHL/TA/GS+ with 5/5 muscle strength on right-side. 4/5 EHL/TA 5/5 GS left-side, this is her baseline due to a history of left-foot weakness prior to this surgery.   Pulses 2+ b/l              I&O's Detail      LABS:                        8.6    9.72  )-----------( 167      ( 24 Apr 2024 05:30 )             25.9     04-24    140  |  106  |  21  ----------------------------<  117<H>  4.6   |  27  |  0.56    Ca    9.0      24 Apr 2024 05:30        Urinalysis Basic - ( 24 Apr 2024 05:30 )    Color: x / Appearance: x / SG: x / pH: x  Gluc: 117 mg/dL / Ketone: x  / Bili: x / Urobili: x   Blood: x / Protein: x / Nitrite: x   Leuk Esterase: x / RBC: x / WBC x   Sq Epi: x / Non Sq Epi: x / Bacteria: x        MEDICATIONS:    acetaminophen     Tablet .. 1000 milliGRAM(s) Oral every 8 hours  celecoxib 100 milliGRAM(s) Oral every 12 hours  escitalopram 15 milliGRAM(s) Oral at bedtime  HYDROmorphone  Injectable 0.5 milliGRAM(s) IV Push once  ondansetron Injectable 4 milliGRAM(s) IV Push every 6 hours PRN  oxyCODONE    IR 5 milliGRAM(s) Oral every 6 hours PRN    aspirin enteric coated 81 milliGRAM(s) Oral two times a day        ASSESSMENT AND PLAN: 80yo Female s/p right THR    1. Analgesic pain control  2. DVT prophylaxis: ASA    3. Weight Bearing Status: Weight bearing as tolerated  4. Disposition: Home pending PT clearance- CM d/w patient and she wants to go home tomorrow am, has aide for 3h setup.   5. Patient fell and hit head 4/23: waiting for results from head CT- negative medically stable for dc d/w Dr. Stockton  6. HTN- Continue home medication   7. HLD- Continue home medication  POST OPERATIVE DAY #: 1  STATUS POST: Right THR                        SUBJECTIVE: Patient seen and examined. Patient has been experiencing mild right hip soreness following the procedure. She denies radiation of the pain. Patient experienced a fall the day before surgery, hit her head and had dizziness. She denies any further episodes of dizziness following the fall. Patient complains of a mild headache that began earlier this morning. She is seeking rehab upon discharge because she is worried about falling at home. She lives alone and is responsible for doing everything herself.  Denies any sob/cp/n/v/numbness or tingling in b/l. Denies any difficulty urinating or with bowel movements.       OBJECTIVE: Patient appeared comfortable with hip pain well-controlled.     Vital Signs Last 24 Hrs  T(C): 37.1 (24 Apr 2024 09:28), Max: 37.1 (24 Apr 2024 09:28)  T(F): 98.7 (24 Apr 2024 09:28), Max: 98.7 (24 Apr 2024 09:28)  HR: 85 (24 Apr 2024 09:28) (68 - 90)  BP: 124/67 (24 Apr 2024 09:28) (96/60 - 124/67)  BP(mean): 77 (23 Apr 2024 16:04) (73 - 80)  RR: 16 (24 Apr 2024 09:28) (10 - 23)  SpO2: 95% (24 Apr 2024 09:28) (85% - 98%)    Parameters below as of 24 Apr 2024 09:28  Patient On (Oxygen Delivery Method): room air        General: NAD and well-appearing  Affected extremity: Right LE skin intact, non-erythematous and non-ecchymotic  Dressing: clean/dry/intact   Sensation: intact to light touch to patient's baseline  Motor exam: EHL/TA/GS+ with 5/5 muscle strength on right-side. EHL/TA firing, 5/5 GS left-side, this is her baseline due to a history of left-foot weakness prior to this surgery.   Pulses 2+ b/l              I&O's Detail      LABS:                        8.6    9.72  )-----------( 167      ( 24 Apr 2024 05:30 )             25.9     04-24    140  |  106  |  21  ----------------------------<  117<H>  4.6   |  27  |  0.56    Ca    9.0      24 Apr 2024 05:30        Urinalysis Basic - ( 24 Apr 2024 05:30 )    Color: x / Appearance: x / SG: x / pH: x  Gluc: 117 mg/dL / Ketone: x  / Bili: x / Urobili: x   Blood: x / Protein: x / Nitrite: x   Leuk Esterase: x / RBC: x / WBC x   Sq Epi: x / Non Sq Epi: x / Bacteria: x        MEDICATIONS:    acetaminophen     Tablet .. 1000 milliGRAM(s) Oral every 8 hours  celecoxib 100 milliGRAM(s) Oral every 12 hours  escitalopram 15 milliGRAM(s) Oral at bedtime  HYDROmorphone  Injectable 0.5 milliGRAM(s) IV Push once  ondansetron Injectable 4 milliGRAM(s) IV Push every 6 hours PRN  oxyCODONE    IR 5 milliGRAM(s) Oral every 6 hours PRN    aspirin enteric coated 81 milliGRAM(s) Oral two times a day        ASSESSMENT AND PLAN: 78yo Female s/p right THR    1. Analgesic pain control  2. DVT prophylaxis: ASA    3. Weight Bearing Status: Weight bearing as tolerated  4. Disposition: Home pending PT clearance- CM d/w patient and she wants to go home tomorrow am, has aide for 3h setup.   5. Dizziness after fall- waiting for results from head CT- negative medically stable for dc d/w Dr. Stockton. Patient has a loop recorder present from 3 years ago 2/2 to stroke.  6. HTN- Continue home medication   7. HLD- Continue home medication

## 2024-04-24 NOTE — DISCHARGE NOTE PROVIDER - NSDCMRMEDTOKEN_GEN_ALL_CORE_FT
acetaminophen 500 mg oral tablet: 2 tab(s) orally every 8 hours  amLODIPine 10 mg oral tablet: 1 tab(s) orally once a day  aspirin 81 mg oral delayed release tablet: 1 tab(s) orally 2 times a day  atorvastatin 40 mg oral tablet: 1 tab(s) orally once a day  celecoxib 100 mg oral capsule: 1 cap(s) orally every 12 hours  escitalopram 5 mg oral tablet: 3 tab(s) orally once a day (at bedtime)  metoprolol succinate 50 mg oral tablet, extended release: 1 tab(s) orally 2 times a day  omeprazole 40 mg oral delayed release capsule: 1 cap(s) orally once a day  senna leaf extract oral tablet: 2 tab(s) orally once a day (at bedtime)   acetaminophen 500 mg oral tablet: 2 tab(s) orally every 8 hours  amLODIPine 10 mg oral tablet: 1 tab(s) orally once a day  aspirin 81 mg oral delayed release tablet: 1 tab(s) orally 2 times a day  atorvastatin 40 mg oral tablet: 1 tab(s) orally once a day  celecoxib 100 mg oral capsule: 1 cap(s) orally every 12 hours  escitalopram 5 mg oral tablet: 3 tab(s) orally once a day (at bedtime)  metoprolol succinate 50 mg oral tablet, extended release: 1 tab(s) orally 2 times a day  omeprazole 40 mg oral delayed release capsule: 1 cap(s) orally once a day  oxyCODONE 5 mg oral tablet: 1 tab(s) orally every 6 hours As needed Moderate Pain (4 - 6)  polyethylene glycol 3350 oral powder for reconstitution: 17 gram(s) orally once a day (at bedtime)  senna leaf extract oral tablet: 2 tab(s) orally once a day (at bedtime)

## 2024-04-24 NOTE — OCCUPATIONAL THERAPY INITIAL EVALUATION ADULT - GENERAL OBSERVATIONS, REHAB EVAL
Pt received semi-supine in bed, +heplock, +R hip dressing C/D/I, in NAD and agreeable to OT. Cleared by ELIZABETH Ramirez to see.

## 2024-04-24 NOTE — OCCUPATIONAL THERAPY INITIAL EVALUATION ADULT - ADDITIONAL COMMENTS
Patient reports she lives in elevator apartment with 4 RACHAEL. Prior to admit, patient ambulated with SC. Has tub shower with shower chair and grab bars. Owns commode.

## 2024-04-24 NOTE — DISCHARGE NOTE PROVIDER - CARE PROVIDER_API CALL
Jos Archer  Orthopaedic Surgery  [address]  [place] [address]  Phone: [phone]  Fax: [phone]  Follow Up Time:

## 2024-04-24 NOTE — PROGRESS NOTE ADULT - SUBJECTIVE AND OBJECTIVE BOX
Ortho Progress Note    Procedure: RIGHT Posterior MATTIE - [facility]  Surgeon: Dr. NGUYEN Archer    Pt comfortable without complaints, pain controlled  Denies CP, SOB, N/V, numbness/tingling     Vital Signs Last 24 Hrs  T(C): 36.8 (04-24-24 @ 05:10), Max: 36.8 (04-24-24 @ 05:10)  T(F): 98.3 (04-24-24 @ 05:10), Max: 98.3 (04-24-24 @ 05:10)  HR: 78 (04-24-24 @ 05:10) (73 - 78)  BP: 102/59 (04-24-24 @ 05:10) (96/60 - 102/59)  BP(mean): --  RR: 16 (04-24-24 @ 05:10) (16 - 18)  SpO2: 96% (04-24-24 @ 05:10) (96% - 96%)    General: Pt Alert and oriented, NAD  Dermabond prineo C/D/I; ASIS incision C/D/I  Pulses: 2+ DP  Sensation: SILT grossly SPN/DPN/Saph/Randee/Tib  Motor: 5/5 TA/GS/EHL, Quad/Psoas firing limited 2/2 pain    Post-op X-Ray: hardware intact w/o fracture    A/P: 79yFemale s/p RIGHT Posterior Jagjit MATTIE by Dr. NGUYEN Archer on 04-23  - Stable  - Pain Control  - DVT ppx: ASA 81 BID  - Post op abx: Ancef  - WBS: WBAT  - HPT pending clearance  - F/u CT head: pt reports fall w/head strike the day before surgery; currently AAOx3 with no S/S of neurological compromise

## 2024-04-24 NOTE — DISCHARGE NOTE PROVIDER - NSDCCPTREATMENT_GEN_ALL_CORE_FT
PRINCIPAL PROCEDURE  Procedure: Total replacement of right hip using KODI  Findings and Treatment:

## 2024-04-24 NOTE — OCCUPATIONAL THERAPY INITIAL EVALUATION ADULT - DIAGNOSIS, OT EVAL
Pt is s/p R THR using KODI (4/23) presents at functional baseline, able to perform all ADLs and functional mobility tasks at [facility]. No further acute OT needs. Pt will be d/c from OT at this time.

## 2024-04-24 NOTE — OCCUPATIONAL THERAPY INITIAL EVALUATION ADULT - MODIFIED CLINICAL TEST OF SENSORY INTEGRATION IN BALANCE TEST
Pt able to ambulate ~200' with RW and modified independence, no LOB noted, able to safely negotiate up/down steps safely as well.

## 2024-04-25 VITALS
HEART RATE: 72 BPM | DIASTOLIC BLOOD PRESSURE: 59 MMHG | OXYGEN SATURATION: 94 % | SYSTOLIC BLOOD PRESSURE: 96 MMHG | TEMPERATURE: 98 F | RESPIRATION RATE: 17 BRPM

## 2024-04-25 PROCEDURE — 80048 BASIC METABOLIC PNL TOTAL CA: CPT

## 2024-04-25 PROCEDURE — 70450 CT HEAD/BRAIN W/O DYE: CPT | Mod: MC

## 2024-04-25 PROCEDURE — C1713: CPT

## 2024-04-25 PROCEDURE — C9399: CPT

## 2024-04-25 PROCEDURE — 97116 GAIT TRAINING THERAPY: CPT

## 2024-04-25 PROCEDURE — 72170 X-RAY EXAM OF PELVIS: CPT

## 2024-04-25 PROCEDURE — 85027 COMPLETE CBC AUTOMATED: CPT

## 2024-04-25 PROCEDURE — C1776: CPT

## 2024-04-25 PROCEDURE — S2900: CPT

## 2024-04-25 PROCEDURE — 36415 COLL VENOUS BLD VENIPUNCTURE: CPT

## 2024-04-25 PROCEDURE — 97161 PT EVAL LOW COMPLEX 20 MIN: CPT

## 2024-04-25 RX ORDER — POLYETHYLENE GLYCOL 3350 17 G/17G
17 POWDER, FOR SOLUTION ORAL
Qty: 0 | Refills: 0 | DISCHARGE
Start: 2024-04-25

## 2024-04-25 RX ORDER — OXYCODONE HYDROCHLORIDE 5 MG/1
1 TABLET ORAL
Qty: 0 | Refills: 0 | DISCHARGE
Start: 2024-04-25

## 2024-04-25 RX ADMIN — Medication 1000 MILLIGRAM(S): at 06:31

## 2024-04-25 RX ADMIN — Medication 81 MILLIGRAM(S): at 05:31

## 2024-04-25 RX ADMIN — CELECOXIB 100 MILLIGRAM(S): 200 CAPSULE ORAL at 06:31

## 2024-04-25 RX ADMIN — Medication 1 TABLET(S): at 11:36

## 2024-04-25 RX ADMIN — FAMOTIDINE 20 MILLIGRAM(S): 10 INJECTION INTRAVENOUS at 11:36

## 2024-04-25 RX ADMIN — Medication 1000 MILLIGRAM(S): at 05:31

## 2024-04-25 RX ADMIN — CELECOXIB 100 MILLIGRAM(S): 200 CAPSULE ORAL at 05:31

## 2024-04-25 RX ADMIN — OXYCODONE HYDROCHLORIDE 5 MILLIGRAM(S): 5 TABLET ORAL at 12:36

## 2024-04-25 RX ADMIN — OXYCODONE HYDROCHLORIDE 5 MILLIGRAM(S): 5 TABLET ORAL at 02:43

## 2024-04-25 RX ADMIN — OXYCODONE HYDROCHLORIDE 5 MILLIGRAM(S): 5 TABLET ORAL at 11:36

## 2024-04-25 RX ADMIN — OXYCODONE HYDROCHLORIDE 5 MILLIGRAM(S): 5 TABLET ORAL at 03:43

## 2024-04-25 NOTE — PROGRESS NOTE ADULT - SUBJECTIVE AND OBJECTIVE BOX
Ortho Progress Note    Procedure: RIGHT Posterior MATTIE - [facility]  Surgeon: Dr. NGUYEN Archer    Pt comfortable without complaints, pain controlled  Denies CP, SOB, N/V, numbness/tingling     Vital Signs Last 24 Hrs  T(C): 36.6 (25 Apr 2024 04:49), Max: 37.1 (24 Apr 2024 09:28)  T(F): 97.8 (25 Apr 2024 04:49), Max: 98.7 (24 Apr 2024 09:28)  HR: 67 (25 Apr 2024 04:49) (67 - 85)  BP: 103/66 (25 Apr 2024 04:49) (96/55 - 124/67)  BP(mean): --  RR: 17 (25 Apr 2024 04:49) (16 - 17)  SpO2: 97% (25 Apr 2024 04:49) (92% - 97%)    Parameters below as of 25 Apr 2024 04:49  Patient On (Oxygen Delivery Method): room air    General: Pt Alert and oriented, NAD  Dermabond prineo C/D/I; ASIS incision C/D/I  Pulses: 2+ DP  Sensation: SILT grossly SPN/DPN/Saph/Randee/Tib  Motor: 5/5 TA/GS/EHL, Quad/Psoas firing limited 2/2 pain    Post-op X-Ray: hardware intact w/o fracture    A/P: 79yFemale s/p RIGHT Posterior Jagjit MATTIE by Dr. NGUYEN Archer on 04-23  - Stable  - Pain Control  - DVT ppx: ASA 81 BID  - Post op abx: Ancef  - WBS: WBAT  - Cleared PT, DC home today  - CT head negative

## 2024-04-26 ENCOUNTER — TRANSCRIPTION ENCOUNTER (OUTPATIENT)
Age: 79
End: 2024-04-26

## 2024-04-29 ENCOUNTER — TRANSCRIPTION ENCOUNTER (OUTPATIENT)
Age: 79
End: 2024-04-29

## 2024-04-30 DIAGNOSIS — M16.11 UNILATERAL PRIMARY OSTEOARTHRITIS, RIGHT HIP: ICD-10-CM

## 2024-04-30 DIAGNOSIS — M48.00 SPINAL STENOSIS, SITE UNSPECIFIED: ICD-10-CM

## 2024-04-30 DIAGNOSIS — Z91.81 HISTORY OF FALLING: ICD-10-CM

## 2024-04-30 DIAGNOSIS — F32.A DEPRESSION, UNSPECIFIED: ICD-10-CM

## 2024-04-30 DIAGNOSIS — M25.751 OSTEOPHYTE, RIGHT HIP: ICD-10-CM

## 2024-04-30 DIAGNOSIS — I10 ESSENTIAL (PRIMARY) HYPERTENSION: ICD-10-CM

## 2024-04-30 DIAGNOSIS — Z96.651 PRESENCE OF RIGHT ARTIFICIAL KNEE JOINT: ICD-10-CM

## 2024-04-30 DIAGNOSIS — Z86.73 PERSONAL HISTORY OF TRANSIENT ISCHEMIC ATTACK (TIA), AND CEREBRAL INFARCTION WITHOUT RESIDUAL DEFICITS: ICD-10-CM

## 2024-04-30 DIAGNOSIS — Z79.82 LONG TERM (CURRENT) USE OF ASPIRIN: ICD-10-CM

## 2024-04-30 DIAGNOSIS — Z88.5 ALLERGY STATUS TO NARCOTIC AGENT: ICD-10-CM

## 2024-04-30 DIAGNOSIS — D62 ACUTE POSTHEMORRHAGIC ANEMIA: ICD-10-CM

## 2024-04-30 DIAGNOSIS — E78.5 HYPERLIPIDEMIA, UNSPECIFIED: ICD-10-CM

## 2024-05-10 ENCOUNTER — TRANSCRIPTION ENCOUNTER (OUTPATIENT)
Age: 79
End: 2024-05-10

## 2024-05-10 ENCOUNTER — RX RENEWAL (OUTPATIENT)
Age: 79
End: 2024-05-10

## 2024-05-15 ENCOUNTER — APPOINTMENT (OUTPATIENT)
Dept: ORTHOPEDIC SURGERY | Facility: CLINIC | Age: 79
End: 2024-05-15
Payer: MEDICARE

## 2024-05-15 VITALS
HEIGHT: 59 IN | DIASTOLIC BLOOD PRESSURE: 72 MMHG | HEART RATE: 78 BPM | WEIGHT: 132 LBS | BODY MASS INDEX: 26.61 KG/M2 | OXYGEN SATURATION: 99 % | SYSTOLIC BLOOD PRESSURE: 147 MMHG

## 2024-05-15 PROBLEM — I63.9 CEREBRAL INFARCTION, UNSPECIFIED: Chronic | Status: ACTIVE | Noted: 2023-12-21

## 2024-05-15 PROCEDURE — 99024 POSTOP FOLLOW-UP VISIT: CPT

## 2024-05-15 PROCEDURE — 73502 X-RAY EXAM HIP UNI 2-3 VIEWS: CPT | Mod: RT

## 2024-05-15 RX ORDER — ACETAMINOPHEN 500 MG/1
500 TABLET ORAL
Qty: 84 | Refills: 0 | Status: ACTIVE | COMMUNITY
Start: 2024-04-10 | End: 1900-01-01

## 2024-05-15 NOTE — HISTORY OF PRESENT ILLNESS
[de-identified] : Patient is a pleasant 79-year-old woman comes in for evaluation of her right hip.  She is 3 weeks status post right total hip arthroplasty.  She feels she is progressing very well.  She does feel the lengthening that occurred with equalization of her legs.  However she is not using any assistive devices.  She is doing home PT but would like to be more aggressive PT.  She denies fevers chills night sweats nausea vomiting.  No chest pain or shortness of breath.  She is taking her aspirin as instructed for VTE prophylaxis.  She is using only acetaminophen for pain. [de-identified] : On exam she is alert and oriented.  She is walking with minimal antalgia no Trendelenburg.  Her incision says her incisions are very well-healed.  She is neurologically intact and has a negative Homans.  There is no pain with gentle functional range of motion of the and [de-identified] : There are 3 views of the hip that show well aligned hip arthroplasty with no evidence for any fracture dislocation or other abnormality [de-identified] :  3 weeks S/P total hip replacement, progressing well.  Symptomatic and pain relief, range of motion, activity advancement and precaution strategies reviewed, including use of over-the-counter medications as needed.  I provided her with a refill for acetaminophen which she is using for pain control.  She is due to finish her aspirin as indicated for VTE prophylaxis.  I provided an outpatient physical therapy prescription.  We provided information regarding the need for antibiotic prophylaxis for future dental or invasive procedures. We will follow up as scheduled in 8 weeks, but advised them to return sooner if they develops any concerns for an evaluation.  Right

## 2024-05-24 ENCOUNTER — TRANSCRIPTION ENCOUNTER (OUTPATIENT)
Age: 79
End: 2024-05-24

## 2024-06-28 ENCOUNTER — OUTPATIENT (OUTPATIENT)
Dept: OUTPATIENT SERVICES | Facility: HOSPITAL | Age: 79
LOS: 1 days | End: 2024-06-28
Payer: MEDICARE

## 2024-06-28 ENCOUNTER — APPOINTMENT (OUTPATIENT)
Dept: ORTHOPEDIC SURGERY | Facility: CLINIC | Age: 79
End: 2024-06-28
Payer: MEDICARE

## 2024-06-28 VITALS
HEIGHT: 59 IN | OXYGEN SATURATION: 97 % | WEIGHT: 132 LBS | HEART RATE: 73 BPM | DIASTOLIC BLOOD PRESSURE: 81 MMHG | BODY MASS INDEX: 26.61 KG/M2 | SYSTOLIC BLOOD PRESSURE: 137 MMHG

## 2024-06-28 DIAGNOSIS — Z98.890 OTHER SPECIFIED POSTPROCEDURAL STATES: Chronic | ICD-10-CM

## 2024-06-28 DIAGNOSIS — Z96.649 PRESENCE OF UNSPECIFIED ARTIFICIAL HIP JOINT: ICD-10-CM

## 2024-06-28 DIAGNOSIS — Z96.642 PRESENCE OF LEFT ARTIFICIAL HIP JOINT: Chronic | ICD-10-CM

## 2024-06-28 DIAGNOSIS — Z96.641 PRESENCE OF RIGHT ARTIFICIAL HIP JOINT: ICD-10-CM

## 2024-06-28 DIAGNOSIS — Z96.642 PRESENCE OF LEFT ARTIFICIAL HIP JOINT: ICD-10-CM

## 2024-06-28 DIAGNOSIS — Z96.659 PRESENCE OF UNSPECIFIED ARTIFICIAL KNEE JOINT: Chronic | ICD-10-CM

## 2024-06-28 PROCEDURE — 99024 POSTOP FOLLOW-UP VISIT: CPT

## 2024-06-28 PROCEDURE — 73521 X-RAY EXAM HIPS BI 2 VIEWS: CPT

## 2024-06-28 PROCEDURE — 73521 X-RAY EXAM HIPS BI 2 VIEWS: CPT | Mod: 26

## 2024-07-19 ENCOUNTER — RX RENEWAL (OUTPATIENT)
Age: 79
End: 2024-07-19

## 2024-08-21 ENCOUNTER — APPOINTMENT (OUTPATIENT)
Dept: ORTHOPEDIC SURGERY | Facility: CLINIC | Age: 79
End: 2024-08-21
Payer: MEDICARE

## 2024-08-21 VITALS
DIASTOLIC BLOOD PRESSURE: 74 MMHG | HEART RATE: 61 BPM | SYSTOLIC BLOOD PRESSURE: 126 MMHG | HEIGHT: 59 IN | OXYGEN SATURATION: 98 % | WEIGHT: 132 LBS | BODY MASS INDEX: 26.61 KG/M2

## 2024-08-21 DIAGNOSIS — Z96.651 PRESENCE OF RIGHT ARTIFICIAL KNEE JOINT: ICD-10-CM

## 2024-08-21 DIAGNOSIS — Z96.649 PRESENCE OF UNSPECIFIED ARTIFICIAL HIP JOINT: ICD-10-CM

## 2024-08-21 DIAGNOSIS — Z96.641 PRESENCE OF RIGHT ARTIFICIAL HIP JOINT: ICD-10-CM

## 2024-08-21 DIAGNOSIS — Z96.642 PRESENCE OF LEFT ARTIFICIAL HIP JOINT: ICD-10-CM

## 2024-08-21 PROCEDURE — 99213 OFFICE O/P EST LOW 20 MIN: CPT

## 2024-08-21 PROCEDURE — 73522 X-RAY EXAM HIPS BI 3-4 VIEWS: CPT

## 2024-08-21 NOTE — DISCUSSION/SUMMARY
[de-identified] : S/P bilateral total hip replacement, most recently 4 months ago on the right with a more remote revision on the left, progressing well.  Symptomatic and pain relief, range of motion, activity advancement and precaution strategies reviewed, including use of over-the-counter medications as needed.  I am referring her for evaluation of her spine and we provided contact information for that.  Providing her with a prescription for physical therapy is that she may benefit from a more regular course now that she has recovered from COVID.  We provided information regarding the need for antibiotic prophylaxis for future dental or invasive procedures. We will follow up as scheduled in 6 months, but advised them to return sooner if they develops any concerns for an evaluation.  Patient is agreement this plan.  All questions answered.

## 2024-08-21 NOTE — HISTORY OF PRESENT ILLNESS
[de-identified] : Patient is a pleasant 79-year-old woman is 4-month status post right total hip arthroplasty.  Overall she is doing well.  She does notice a slight leg length discrepancy and is using a soft insert on the left side.  She is ambulating with the help with a cane for poor shopping cart at times but using no assistive devices for short distances.  She is only intermittently done therapy because she did have COVID.  She continues to have back pain as well.

## 2024-08-21 NOTE — PHYSICAL EXAM
Gave message to BENJAMIN plummer for him to review   [de-identified] : Exam she alert and oriented.  She is walking a short shuffling gait but no obvious antalgia or Trendelenburg today.  Both hip incisions are well-healed.  She is neurologically intact but has slight weakness of the left foot with dorsiflexion foot eversion appropriate.  Compared to the right. [de-identified] : 3 views of the hips are ordered and reviewed to evaluate her hip arthroplasties.  There is a revision prosthesis on the left and primary on the right.  There is no evidence of any hardware complication loosening fracture or dislocation.  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

## 2024-09-05 ENCOUNTER — APPOINTMENT (OUTPATIENT)
Dept: ORTHOPEDIC SURGERY | Facility: CLINIC | Age: 79
End: 2024-09-05

## 2025-04-09 ENCOUNTER — APPOINTMENT (OUTPATIENT)
Dept: ORTHOPEDIC SURGERY | Facility: CLINIC | Age: 80
End: 2025-04-09
Payer: MEDICARE

## 2025-04-09 VITALS
HEART RATE: 74 BPM | SYSTOLIC BLOOD PRESSURE: 121 MMHG | OXYGEN SATURATION: 98 % | HEIGHT: 59 IN | WEIGHT: 132 LBS | DIASTOLIC BLOOD PRESSURE: 73 MMHG | BODY MASS INDEX: 26.61 KG/M2

## 2025-04-09 DIAGNOSIS — Z96.651 PRESENCE OF RIGHT ARTIFICIAL KNEE JOINT: ICD-10-CM

## 2025-04-09 DIAGNOSIS — Z96.642 PRESENCE OF LEFT ARTIFICIAL HIP JOINT: ICD-10-CM

## 2025-04-09 DIAGNOSIS — Z96.641 PRESENCE OF RIGHT ARTIFICIAL HIP JOINT: ICD-10-CM

## 2025-04-09 DIAGNOSIS — Z96.649 PRESENCE OF UNSPECIFIED ARTIFICIAL HIP JOINT: ICD-10-CM

## 2025-04-09 PROCEDURE — 99213 OFFICE O/P EST LOW 20 MIN: CPT

## 2025-04-09 PROCEDURE — 73562 X-RAY EXAM OF KNEE 3: CPT | Mod: 50

## 2025-04-09 PROCEDURE — 73522 X-RAY EXAM HIPS BI 3-4 VIEWS: CPT

## 2025-04-11 ENCOUNTER — APPOINTMENT (OUTPATIENT)
Dept: ORTHOPEDIC SURGERY | Facility: CLINIC | Age: 80
End: 2025-04-11
Payer: MEDICARE

## 2025-04-11 VITALS — HEIGHT: 59 IN | BODY MASS INDEX: 26.61 KG/M2 | WEIGHT: 132 LBS

## 2025-04-11 DIAGNOSIS — M19.012 PRIMARY OSTEOARTHRITIS, LEFT SHOULDER: ICD-10-CM

## 2025-04-11 PROCEDURE — 99214 OFFICE O/P EST MOD 30 MIN: CPT

## 2025-04-11 PROCEDURE — 73030 X-RAY EXAM OF SHOULDER: CPT | Mod: LT

## 2025-04-17 ENCOUNTER — APPOINTMENT (OUTPATIENT)
Dept: ORTHOPEDIC SURGERY | Facility: CLINIC | Age: 80
End: 2025-04-17
Payer: MEDICARE

## 2025-04-17 VITALS
DIASTOLIC BLOOD PRESSURE: 76 MMHG | OXYGEN SATURATION: 97 % | HEART RATE: 69 BPM | SYSTOLIC BLOOD PRESSURE: 130 MMHG | BODY MASS INDEX: 26.61 KG/M2 | HEIGHT: 59 IN | WEIGHT: 132 LBS

## 2025-04-17 DIAGNOSIS — M47.816 SPONDYLOSIS W/OUT MYELOPATHY OR RADICULOPATHY, LUMBAR REGION: ICD-10-CM

## 2025-04-17 DIAGNOSIS — Z87.39 PERSONAL HISTORY OF OTHER DISEASES OF THE MUSCULOSKELETAL SYSTEM AND CONNECTIVE TISSUE: ICD-10-CM

## 2025-04-17 PROCEDURE — 99215 OFFICE O/P EST HI 40 MIN: CPT

## 2025-04-17 PROCEDURE — G2212 PROLONG OUTPT/OFFICE VIS: CPT

## 2025-04-17 PROCEDURE — 72110 X-RAY EXAM L-2 SPINE 4/>VWS: CPT

## 2025-09-04 ENCOUNTER — RESULT REVIEW (OUTPATIENT)
Age: 80
End: 2025-09-04

## 2025-09-04 ENCOUNTER — OUTPATIENT (OUTPATIENT)
Dept: OUTPATIENT SERVICES | Facility: HOSPITAL | Age: 80
LOS: 1 days | End: 2025-09-04

## 2025-09-04 ENCOUNTER — APPOINTMENT (OUTPATIENT)
Dept: CT IMAGING | Facility: CLINIC | Age: 80
End: 2025-09-04
Payer: MEDICARE

## 2025-09-04 ENCOUNTER — APPOINTMENT (OUTPATIENT)
Dept: ORTHOPEDIC SURGERY | Facility: CLINIC | Age: 80
End: 2025-09-04

## 2025-09-04 DIAGNOSIS — Z98.890 OTHER SPECIFIED POSTPROCEDURAL STATES: Chronic | ICD-10-CM

## 2025-09-04 DIAGNOSIS — Z96.642 PRESENCE OF LEFT ARTIFICIAL HIP JOINT: Chronic | ICD-10-CM

## 2025-09-04 DIAGNOSIS — Z96.659 PRESENCE OF UNSPECIFIED ARTIFICIAL KNEE JOINT: Chronic | ICD-10-CM

## 2025-09-04 PROCEDURE — 76376 3D RENDER W/INTRP POSTPROCES: CPT | Mod: 26

## 2025-09-04 PROCEDURE — 73200 CT UPPER EXTREMITY W/O DYE: CPT | Mod: 26,LT

## 2025-09-10 ENCOUNTER — NON-APPOINTMENT (OUTPATIENT)
Age: 80
End: 2025-09-10

## 2025-09-19 ENCOUNTER — APPOINTMENT (OUTPATIENT)
Dept: ORTHOPEDIC SURGERY | Facility: CLINIC | Age: 80
End: 2025-09-19
Payer: MEDICARE

## 2025-09-19 VITALS — WEIGHT: 132 LBS | BODY MASS INDEX: 26.61 KG/M2 | RESPIRATION RATE: 16 BRPM | HEIGHT: 59 IN

## 2025-09-19 DIAGNOSIS — M19.012 PRIMARY OSTEOARTHRITIS, LEFT SHOULDER: ICD-10-CM

## 2025-09-19 PROCEDURE — 99212 OFFICE O/P EST SF 10 MIN: CPT

## (undated) DEVICE — GLV 8.5 PROTEXIS (WHITE)

## (undated) DEVICE — DRAPE TOWEL BLUE 17" X 24"

## (undated) DEVICE — DRAPE STERI-DRAPE INCISE 32X33"

## (undated) DEVICE — GLV 7.5 PROTEXIS (WHITE)

## (undated) DEVICE — PACK ANTERIOR HIP ACSRY LNX SURGICOUNT

## (undated) DEVICE — DRAPE C ARM 41X74"

## (undated) DEVICE — SUT ETHIBOND 5 4-30" V-37

## (undated) DEVICE — DRAPE LIGHT HANDLE COVER (BLUE)

## (undated) DEVICE — HOOD T7 PLUS PEELAWAY

## (undated) DEVICE — DRAPE EXTREMITY BILATERAL LIMB 107.5" X 138" X 86"

## (undated) DEVICE — SUT ETHILON 3-0 18" PS-1

## (undated) DEVICE — MAKO VIZADISC HIP PROCEDURE TRACKING KIT

## (undated) DEVICE — PACK TOTAL HIP

## (undated) DEVICE — WOUND IRR SURGIPHOR

## (undated) DEVICE — SUT STRATAFIX SYMMETRIC PDS PLUS 1 60CM CTX VIOLET

## (undated) DEVICE — BLADE SURGICAL #15 CARBON

## (undated) DEVICE — MARKING PEN W RULER

## (undated) DEVICE — SUT PDS II PLUS 1 27" CP-1

## (undated) DEVICE — SUT STRATAFIX SYMMETRIC PDS PLUS 1 18" CT

## (undated) DEVICE — NDL HYPO SAFE 20G X 1.5" (YELLOW)

## (undated) DEVICE — DRAPE ISOLATION W INCISE FILM & POUCH

## (undated) DEVICE — SUT ETHIBOND 1 30" OS6

## (undated) DEVICE — SUT PDS II PLUS 1 27" CT-1

## (undated) DEVICE — SUT RETRIEVER LASSO HOOK

## (undated) DEVICE — SUT STRATAFIX SPIRAL PDO 0 24CM CP-2

## (undated) DEVICE — SUT VICRYL PLUS 2-0 27" SH UNDYED

## (undated) DEVICE — GLV 7.5 PROTEXIS ORTHO (CREAM)

## (undated) DEVICE — NDL HYPO SAFE 22G X 1.5" (BLACK)

## (undated) DEVICE — SUT STRATAFIX SPIRAL PDO 1 30CM OS-6

## (undated) DEVICE — DRAPE HIP W POUCHES 87X115X134"

## (undated) DEVICE — MAKO DRAPE KIT

## (undated) DEVICE — SUT MONOCRYL 3-0 18" PS-1

## (undated) DEVICE — NDL HYPO SAFE 22G X 1" (BLACK)

## (undated) DEVICE — DRSG DERMABOND 0.7ML

## (undated) DEVICE — DRILL BIT STRYKER ORTHO LOKG 4.2X360MM

## (undated) DEVICE — DRAPE LIGHT HANDLE COVER (GREEN)

## (undated) DEVICE — PREP DURAPREP 26CC

## (undated) DEVICE — SAW BLADE STRYKER SAGITTAL 81.5X12.5X1.19MM

## (undated) DEVICE — ELCTR BOVIE TIP BLADE MEGADYNE E-Z CLEAN 4" EXTENDED

## (undated) DEVICE — SUT VICRYL 2-0 27" CT-1 UNDYED

## (undated) DEVICE — DRSG DERMABOND PRINEO 22CM

## (undated) DEVICE — DRSG TELFA 3 X 8

## (undated) DEVICE — SUT VICRYL 3-0 18" PS-2 UNDYED

## (undated) DEVICE — SUT MONOCRYL 3-0 27" PS-1 UNDYED

## (undated) DEVICE — HOOD FLYTE STRYKER HELMET SHIELD

## (undated) DEVICE — ELCTR STRYKER NEPTUNE SMOKE EVACUATION PENCIL (GREEN)

## (undated) DEVICE — WARMING BLANKET UPPER ADULT

## (undated) DEVICE — DRSG COBAN 6"

## (undated) DEVICE — SUT VICRYL 3-0 18" TIES UNDYED

## (undated) DEVICE — DRSG STOCKINETTE IMPERVIOUS XL

## (undated) DEVICE — SAW BLADE STRYKER SAGITTAL DUAL CUT 75X18X1.27MM

## (undated) DEVICE — SUT QUILL PDO 2 36CM 40MM

## (undated) DEVICE — DRAPE U POLY BLUE 60X72"

## (undated) DEVICE — SUCTION YANKAUER OPEN TIP NO VENT CURVE

## (undated) DEVICE — GLV 6.5 PROTEXIS (WHITE)

## (undated) DEVICE — VENODYNE/SCD SLEEVE CALF MEDIUM

## (undated) DEVICE — DRAPE IOBAN 33" X 23"

## (undated) DEVICE — SUT STRATAFIX SYMMETRIC PDS 1 45CM OS-6

## (undated) DEVICE — SUT QUILL MONODERM 3-0 30CM 19MM

## (undated) DEVICE — MIDAS REX LEGEND CYLINDER FLUTED SM BORE 6.0MM X 15CM

## (undated) DEVICE — GLV 8 PROTEXIS (WHITE)

## (undated) DEVICE — WOUND IRR IRRISEPT W 0.5 CHG